# Patient Record
Sex: FEMALE | Race: WHITE | NOT HISPANIC OR LATINO | Employment: FULL TIME | URBAN - METROPOLITAN AREA
[De-identification: names, ages, dates, MRNs, and addresses within clinical notes are randomized per-mention and may not be internally consistent; named-entity substitution may affect disease eponyms.]

---

## 2018-01-11 NOTE — MISCELLANEOUS
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)    Plan  EBV seropositivity, Splenic mass    · Follow-up PRN Evaluation and Treatment  Follow-up  Status: Complete  Done:  [id number]   Ordered; For: EBV seropositivity, Splenic mass; Ordered By: Shashi Elias Performed:  Due: [id number]    Discussion/Summary  Discussion Summary: You appear to be doing better  Please make a follow up with Dr Samantha Ferris of Infectious Disease for follow up as recommended when you were discharged  Follow up in 1mo of symptoms persist or sooner if needed  Chief Complaint  Chief Complaint Free Text Note Form: Patient is here today for a hospital follow up, says she is feeling much more like herself  History of Present Illness  TCM Communication [facility]: The patient is being contacted for follow-up after hospitalization and [facility]  She was hospitalized at [facility]  The date of admission: 08/15/2016, date of discharge: 08/17/2016  Diagnosis: Generalized ab pain, Transaminitis, splenic lesion, hypokalemia  She was discharged to home  She scheduled a follow up appointment  Follow-up appointments with other specialists: Infectious Disease Dr PRINCE VELASQUEZ [facility]  feels itchy all over body, glands swollen, Counseling was provided to the patient  Communication performed and completed by TB   HPI: Pt presents for hospital f/u  Presented to this office a few wks ago for various symptoms (see OV note 8/15/16) and sent to the ER  Pt was admitted and found to have a splenic lesion and has tested positive for EBV  Pt states she is doing much better and is 98% better  Still has some fatigue  Is back to work  Did not f/u w/ Dr Samantha Ferris of ID as per discharge summary  No acute complaints today  Review of Systems  Complete-Female:   Constitutional: no fever  Cardiovascular: no chest pain  Respiratory: no shortness of breath  Gastrointestinal: no abdominal pain  Neurological: no headache  Surgical History  Surgical History Reviewed: The surgical history was reviewed and updated today  Family History  Mother    1  Family history of arthritis (V17 7) (Z82 61)   2  Family history of hypertension (V17 49) (Z82 49)  Grandmother    3  Family history of arthritis (V17 7) (Z82 61)   4  Family history of cardiac disorder (V17 49) (Z82 49)   5  Family history of hypertension (V17 49) (Z82 49)   6  Family history of malignant neoplasm of breast (V16 3) (Z80 3)   7  Family history of osteoporosis (V17 81) (Z82 62)  Grandfather    8  Family history of arthritis (V17 7) (Z82 61)   9  Family history of cardiac disorder (V17 49) (Z82 49)   10  Family history of cerebrovascular accident (CVA) (V17 1) (Z82 3)   6  Family history of diabetes mellitus (V18 0) (Z83 3)   12  Family history of hypertension (V17 49) (Z82 49)   13  Family history of osteoporosis (V17 81) (Z82 62)  Family History Reviewed: The family history was reviewed and updated today  Social History    · Alcohol use (V49 89) (Z78 9)   · Caffeine use (V49 89) (F15 90)   · Never a smoker  Social History Reviewed: The social history was reviewed and updated today  Current Meds   1  No Reported Medications Recorded  Medication List Reviewed: The medication list was reviewed and updated today  Allergies    1  Augmentin TABS    Vitals  Signs   Recorded: 13GGA2538 74:90AZ   Systolic: 386  Diastolic: 76  Heart Rate: 80  Respiration: 20  Temperature: 98 7 F  Weight: 242 lb 2 08 oz    Physical Exam    Constitutional   General appearance: No acute distress, well appearing and well nourished  Eyes   Conjunctiva and lids: No swelling, erythema or discharge  Pupils and irises: Equal, round and reactive to light  Ears, Nose, Mouth, and Throat   External inspection of ears and nose: Normal     Otoscopic examination: Tympanic membranes translucent with normal light reflex  Canals patent without erythema      Nasal mucosa, septum, and turbinates: Normal without edema or erythema  Oropharynx: Normal with no erythema, edema, exudate or lesions  Pulmonary   Respiratory effort: No increased work of breathing or signs of respiratory distress  Auscultation of lungs: Clear to auscultation  Cardiovascular   Auscultation of heart: Normal rate and rhythm, normal S1 and S2, without murmurs  Examination of extremities for edema and/or varicosities: Normal     Abdomen   Abdomen: Non-tender, no masses  Liver and spleen: No hepatomegaly or splenomegaly  Lymphatic   Palpation of lymph nodes in neck: No lymphadenopathy  Musculoskeletal   Gait and station: Normal     Neurologic   Cranial nerves: Cranial nerves 2-12 intact  Psychiatric   Orientation to person, place, and time: Normal     Mood and affect: Normal          Provider Comments  Provider Comments:   Pt appears to be doing well  Records form hospital stay reviewed  EBV positive and Splenic mass on imaging  Was supposed to see Dr Nazario Lopez yesterday per discharge summary but did not  I recommended pt call him to make an appt  Pt states she should have his info at home in her discharge packet but will call this office if she needs any information  No additional testing at this time until f/u w/ ID        Signatures   Electronically signed by : ROSA Walsh ; Aug 30 2016  7:10PM EST                       (Author)

## 2018-01-18 NOTE — RESULT NOTES
Verified Results  Urine Dip Automated- POC [id number] 03:49PM [place] Valdemar     Test Name Result Flag Reference   Color Criss     Clarity Transparent     Leukocytes 1+ A    Nitrite neg     Blood 1+ A    Bilirubin 1+     Urobilinogen 2 A    Protein 1+ A    Ph 6     Specific Gravity 1 020     Ketone 2+ A    Glucose neg

## 2018-10-29 ENCOUNTER — OFFICE VISIT (OUTPATIENT)
Dept: FAMILY MEDICINE CLINIC | Facility: CLINIC | Age: 33
End: 2018-10-29
Payer: COMMERCIAL

## 2018-10-29 VITALS
TEMPERATURE: 97.2 F | HEIGHT: 65 IN | OXYGEN SATURATION: 100 % | SYSTOLIC BLOOD PRESSURE: 128 MMHG | BODY MASS INDEX: 44.95 KG/M2 | DIASTOLIC BLOOD PRESSURE: 80 MMHG | RESPIRATION RATE: 20 BRPM | HEART RATE: 77 BPM | WEIGHT: 269.8 LBS

## 2018-10-29 DIAGNOSIS — R03.0 BLOOD PRESSURE ELEVATED WITHOUT HISTORY OF HTN: ICD-10-CM

## 2018-10-29 DIAGNOSIS — R16.1 SPLENIC MASS: ICD-10-CM

## 2018-10-29 DIAGNOSIS — R10.9 RIGHT FLANK PAIN: ICD-10-CM

## 2018-10-29 DIAGNOSIS — R20.0 NUMBNESS AND TINGLING: ICD-10-CM

## 2018-10-29 DIAGNOSIS — S89.91XA INJURY OF RIGHT LOWER LEG, INITIAL ENCOUNTER: ICD-10-CM

## 2018-10-29 DIAGNOSIS — R59.0 ABDOMINAL LYMPHADENOPATHY: ICD-10-CM

## 2018-10-29 DIAGNOSIS — Z23 NEED FOR IMMUNIZATION AGAINST INFLUENZA: ICD-10-CM

## 2018-10-29 DIAGNOSIS — E78.00 PURE HYPERCHOLESTEROLEMIA: ICD-10-CM

## 2018-10-29 DIAGNOSIS — F32.1 CURRENT MODERATE EPISODE OF MAJOR DEPRESSIVE DISORDER WITHOUT PRIOR EPISODE (HCC): ICD-10-CM

## 2018-10-29 DIAGNOSIS — R20.2 NUMBNESS AND TINGLING: ICD-10-CM

## 2018-10-29 DIAGNOSIS — R82.90 ABNORMAL URINE FINDING: Primary | ICD-10-CM

## 2018-10-29 PROCEDURE — 99214 OFFICE O/P EST MOD 30 MIN: CPT | Performed by: FAMILY MEDICINE

## 2018-10-29 PROCEDURE — 90686 IIV4 VACC NO PRSV 0.5 ML IM: CPT

## 2018-10-29 PROCEDURE — 90471 IMMUNIZATION ADMIN: CPT

## 2018-10-29 PROCEDURE — 3008F BODY MASS INDEX DOCD: CPT | Performed by: FAMILY MEDICINE

## 2018-10-29 RX ORDER — ESCITALOPRAM OXALATE 10 MG/1
5 TABLET ORAL DAILY
Qty: 30 TABLET | Refills: 0 | Status: SHIPPED | OUTPATIENT
Start: 2018-10-29 | End: 2018-11-15 | Stop reason: SDUPTHER

## 2018-10-29 NOTE — PROGRESS NOTES
Assessment/Plan:     Problem List Items Addressed This Visit     Splenic mass    Relevant Orders    CT abdomen pelvis w contrast      Other Visit Diagnoses     Abnormal urine finding    -  Primary    Positive ketone  advised patient to increase fluid intake    Relevant Orders    Urinalysis with reflex to microscopic    Right flank pain        Chronic  Relieved after passing her urine  Rule out hydronephrosis    Relevant Orders    Comprehensive metabolic panel    CBC and differential    CT abdomen pelvis w contrast    Numbness and tingling         right heel  Improving, patient to call if continue will check nerve test    Abdominal lymphadenopathy        Relevant Orders    Comprehensive metabolic panel    CBC and differential    CT abdomen pelvis w contrast    Injury of right lower leg, initial encounter        Improving  Avoid strenuous activity, and keep abrasion clean and dry, patient to call if any further problems    Blood pressure elevated without history of HTN        Will recheck blood pressure with next office    Pure hypercholesterolemia        To follow with low-fat diet    Relevant Orders    Lipid Panel with Direct LDL reflex    Current moderate episode of major depressive disorder without prior episode ([facility] [address] )        Lexapro as directed  Avoid drinking alcohol with medication    Relevant Medications    escitalopram (LEXAPRO) 10 mg tablet    Other Relevant Orders    TSH baseline    Need for immunization against influenza        Relevant Orders    SYRINGE/SINGLE-DOSE VIAL: influenza vaccine, [id number], quadrivalent, 0 5 mL, preservative-free, for patients 3+ yr (FLUZONE) (Completed)           Diagnoses and all orders for this visit:    Abnormal urine finding  Comments:  Positive ketone  advised patient to increase fluid intake  Orders:  -     Urinalysis with reflex to microscopic    Right flank pain  Comments:  Chronic  Relieved after passing her urine    Rule out hydronephrosis  Orders:  - Comprehensive metabolic panel; Future  -     CBC and differential; Future  -     CT abdomen pelvis w contrast; Future    Numbness and tingling  Comments:   right heel  Improving, patient to call if continue will check nerve test    Splenic mass  -     CT abdomen pelvis w contrast; Future    Abdominal lymphadenopathy  -     Comprehensive metabolic panel; Future  -     CBC and differential; Future  -     CT abdomen pelvis w contrast; Future    Injury of right lower leg, initial encounter  Comments:  Improving  Avoid strenuous activity, and keep abrasion clean and dry, patient to call if any further problems    Blood pressure elevated without history of HTN  Comments: Will recheck blood pressure with next office    Pure hypercholesterolemia  Comments: To follow with low-fat diet  Orders:  -     Lipid Panel with Direct LDL reflex; Future    Current moderate episode of major depressive disorder without prior episode ([facility] [address] )  Comments:  Lexapro as directed  Avoid drinking alcohol with medication  Orders:  -     escitalopram (LEXAPRO) 10 mg tablet; Take 0 5 tablets (5 mg total) by mouth daily  -     TSH baseline; Future    Need for immunization against influenza  -     SYRINGE/SINGLE-DOSE VIAL: influenza vaccine, [id number], quadrivalent, 0 5 mL, preservative-free, for patients 3+ yr (FLUZONE)            Subjective:     Patient ID: Ruddy Laureano is a 34 y o  female      Patient is here for  Patient stated she had a urine dip when she had her physical for work  Was told her urine dip had large ketones  Patient stated her urine dip was negative for sugar  Patient believe does not drink enough fluids during the day  Depression  , patient to continue to have moderate depression since last office visit     Patient ran out of Lexapro   But did not call to refill it  Denied suicide or homicide  Denied drinking alcohol or doing drugs  Patient admit to mild pain at the right flank area for 1 year, off and on    Patient feels it mostly in the morning     Her pain resolved after she passed her urine  Denied urinary frequency, dysuria or hematuria  Denied injury to her back, admit to heavy lifting at work sometimes  Complain of tingling and numbness at the bottom of the right heel started 1 week ago  It is getting better  Denied swelling or or mass, denied injury to the heel  Admit to injury to the right lower leg, happen about 4 days ago  Patient stated there was a gap between the trailer and that the doc, she felt  She bumped her right lower leg, developed mild pain, and ecchymosis   Pain is better  And patient stated she has been running on her leg with no problem  Had Tdap 2015    No test done since last office visit  Last menstrual period finished 2 days ago  However abdominal and pelvic CT scan done August 2016 reviewed results with patient again        Review of Systems   Constitutional: Negative for activity change, appetite change, chills, fatigue, fever and unexpected weight change  HENT: Negative for congestion, ear pain, sinus pressure and sore throat  Eyes: Negative for visual disturbance  Respiratory: Negative for cough, chest tightness, shortness of breath and wheezing  Cardiovascular: Negative for chest pain, palpitations and leg swelling  Gastrointestinal: Negative for abdominal pain, blood in stool, constipation, diarrhea, nausea and vomiting  Genitourinary: Negative for dysuria, frequency, hematuria and urgency  Musculoskeletal: Negative for arthralgias, gait problem, joint swelling, myalgias and neck pain  Skin: Negative for rash  Neurological: Negative for dizziness, tremors, seizures, syncope, facial asymmetry, weakness, light-headedness and headaches  Hematological: Negative for adenopathy  Does not bruise/bleed easily  Psychiatric/Behavioral: Negative for behavioral problems, confusion, dysphoric mood and sleep disturbance         Objective:     Physical Exam   Constitutional: She is oriented to person, place, and time  She appears well-developed and well-nourished  No distress  HENT:   Head: Normocephalic  Eyes: Pupils are equal, round, and reactive to light  Conjunctivae and EOM are normal  No scleral icterus  Neck: No JVD present  No thyromegaly present  Cardiovascular: Normal rate and regular rhythm  No murmur heard  Pulses:       Carotid pulses are 3+ on the right side, and 3+ on the left side  Popliteal pulses are 3+ on the right side, and 3+ on the left side  Dorsalis pedis pulses are 3+ on the right side  Pulmonary/Chest: Effort normal and breath sounds normal  She has no wheezes  Abdominal: Soft  She exhibits no mass  There is no tenderness  There is no rebound and no guarding  No hernia  No flank tenderness   Musculoskeletal:   Positive mild ecchymosis at the lower distal half of the lower leg medially  With slight dried superficial abrasion  With localized minimal swelling  No calf tenderness or firmness  Lymphadenopathy:     She has no cervical adenopathy  Neurological: She is alert and oriented to person, place, and time  No cranial nerve deficit  She exhibits normal muscle tone  Coordination normal    Skin: No rash noted  Psychiatric: She has a normal mood and affect   Her behavior is normal  Judgment and thought content normal

## 2018-10-31 ENCOUNTER — APPOINTMENT (OUTPATIENT)
Dept: LAB | Facility: CLINIC | Age: 33
End: 2018-10-31
Payer: COMMERCIAL

## 2018-10-31 DIAGNOSIS — R59.0 ABDOMINAL LYMPHADENOPATHY: ICD-10-CM

## 2018-10-31 DIAGNOSIS — F32.1 CURRENT MODERATE EPISODE OF MAJOR DEPRESSIVE DISORDER WITHOUT PRIOR EPISODE (HCC): ICD-10-CM

## 2018-10-31 DIAGNOSIS — E78.00 PURE HYPERCHOLESTEROLEMIA: ICD-10-CM

## 2018-10-31 DIAGNOSIS — R10.9 RIGHT FLANK PAIN: ICD-10-CM

## 2018-10-31 LAB
ALBUMIN SERPL BCP-MCNC: 3.8 G/DL (ref 3.5–5)
ALP SERPL-CCNC: 63 U/L (ref 46–116)
ALT SERPL W P-5'-P-CCNC: 23 U/L (ref 12–78)
ANION GAP SERPL CALCULATED.3IONS-SCNC: 10 MMOL/L (ref 4–13)
AST SERPL W P-5'-P-CCNC: 13 U/L (ref 5–45)
BACTERIA UR QL AUTO: ABNORMAL /HPF
BASOPHILS # BLD AUTO: 0.03 THOUSANDS/ΜL (ref 0–0.1)
BASOPHILS NFR BLD AUTO: 1 % (ref 0–1)
BILIRUB SERPL-MCNC: 0.2 MG/DL (ref 0.2–1)
BILIRUB UR QL STRIP: NEGATIVE
BUN SERPL-MCNC: 10 MG/DL (ref 5–25)
CALCIUM SERPL-MCNC: 8.9 MG/DL (ref 8.3–10.1)
CHLORIDE SERPL-SCNC: 106 MMOL/L (ref 100–108)
CHOLEST SERPL-MCNC: 164 MG/DL (ref 50–200)
CLARITY UR: ABNORMAL
CO2 SERPL-SCNC: 24 MMOL/L (ref 21–32)
COLOR UR: YELLOW
CREAT SERPL-MCNC: 0.66 MG/DL (ref 0.6–1.3)
EOSINOPHIL # BLD AUTO: 0.06 THOUSAND/ΜL (ref 0–0.61)
EOSINOPHIL NFR BLD AUTO: 2 % (ref 0–6)
ERYTHROCYTE [DISTWIDTH] IN BLOOD BY AUTOMATED COUNT: 14.6 % (ref 11.6–15.1)
GFR SERPL CREATININE-BSD FRML MDRD: 118 ML/MIN/1.73SQ M
GLUCOSE P FAST SERPL-MCNC: 96 MG/DL (ref 65–99)
GLUCOSE UR STRIP-MCNC: NEGATIVE MG/DL
HCT VFR BLD AUTO: 39.9 % (ref 34.8–46.1)
HDLC SERPL-MCNC: 76 MG/DL (ref 40–60)
HGB BLD-MCNC: 12.5 G/DL (ref 11.5–15.4)
HGB UR QL STRIP.AUTO: ABNORMAL
IMM GRANULOCYTES # BLD AUTO: 0 THOUSAND/UL (ref 0–0.2)
IMM GRANULOCYTES NFR BLD AUTO: 0 % (ref 0–2)
KETONES UR STRIP-MCNC: NEGATIVE MG/DL
LDLC SERPL CALC-MCNC: 76 MG/DL (ref 0–100)
LEUKOCYTE ESTERASE UR QL STRIP: ABNORMAL
LYMPHOCYTES # BLD AUTO: 1.31 THOUSANDS/ΜL (ref 0.6–4.47)
LYMPHOCYTES NFR BLD AUTO: 33 % (ref 14–44)
MCH RBC QN AUTO: 26.3 PG (ref 26.8–34.3)
MCHC RBC AUTO-ENTMCNC: 31.3 G/DL (ref 31.4–37.4)
MCV RBC AUTO: 84 FL (ref 82–98)
MONOCYTES # BLD AUTO: 0.43 THOUSAND/ΜL (ref 0.17–1.22)
MONOCYTES NFR BLD AUTO: 11 % (ref 4–12)
MUCOUS THREADS UR QL AUTO: ABNORMAL
NEUTROPHILS # BLD AUTO: 2.13 THOUSANDS/ΜL (ref 1.85–7.62)
NEUTS SEG NFR BLD AUTO: 53 % (ref 43–75)
NITRITE UR QL STRIP: NEGATIVE
NON-SQ EPI CELLS URNS QL MICRO: ABNORMAL /HPF
NRBC BLD AUTO-RTO: 0 /100 WBCS
PH UR STRIP.AUTO: 6.5 [PH] (ref 4.5–8)
PLATELET # BLD AUTO: 211 THOUSANDS/UL (ref 149–390)
PMV BLD AUTO: 11.1 FL (ref 8.9–12.7)
POTASSIUM SERPL-SCNC: 4.2 MMOL/L (ref 3.5–5.3)
PROT SERPL-MCNC: 7 G/DL (ref 6.4–8.2)
PROT UR STRIP-MCNC: NEGATIVE MG/DL
RBC # BLD AUTO: 4.76 MILLION/UL (ref 3.81–5.12)
RBC #/AREA URNS AUTO: ABNORMAL /HPF
SODIUM SERPL-SCNC: 140 MMOL/L (ref 136–145)
SP GR UR STRIP.AUTO: 1.02 (ref 1–1.03)
TRIGL SERPL-MCNC: 58 MG/DL
TSH SERPL DL<=0.05 MIU/L-ACNC: 1.37 UIU/ML (ref 0.36–3.74)
UROBILINOGEN UR QL STRIP.AUTO: 0.2 E.U./DL
WBC # BLD AUTO: 3.96 THOUSAND/UL (ref 4.31–10.16)
WBC #/AREA URNS AUTO: ABNORMAL /HPF

## 2018-10-31 PROCEDURE — 80061 LIPID PANEL: CPT

## 2018-10-31 PROCEDURE — 85025 COMPLETE CBC W/AUTO DIFF WBC: CPT

## 2018-10-31 PROCEDURE — 84443 ASSAY THYROID STIM HORMONE: CPT

## 2018-10-31 PROCEDURE — 36415 COLL VENOUS BLD VENIPUNCTURE: CPT

## 2018-10-31 PROCEDURE — 81001 URINALYSIS AUTO W/SCOPE: CPT | Performed by: FAMILY MEDICINE

## 2018-10-31 PROCEDURE — 80053 COMPREHEN METABOLIC PANEL: CPT

## 2018-11-07 ENCOUNTER — HOSPITAL ENCOUNTER (OUTPATIENT)
Dept: CT IMAGING | Facility: HOSPITAL | Age: 33
Discharge: HOME | End: 2018-11-07
Payer: COMMERCIAL

## 2018-11-07 DIAGNOSIS — R16.1 SPLENIC MASS: ICD-10-CM

## 2018-11-07 DIAGNOSIS — R10.9 RIGHT FLANK PAIN: ICD-10-CM

## 2018-11-07 DIAGNOSIS — R59.0 ABDOMINAL LYMPHADENOPATHY: ICD-10-CM

## 2018-11-07 PROCEDURE — 74177 CT ABD & PELVIS W/CONTRAST: CPT

## 2018-11-07 RX ADMIN — IOHEXOL 100 ML: 350 INJECTION, SOLUTION INTRAVENOUS at 20:10

## 2018-11-15 ENCOUNTER — OFFICE VISIT (OUTPATIENT)
Dept: FAMILY MEDICINE CLINIC | Facility: CLINIC | Age: 33
End: 2018-11-15
Payer: COMMERCIAL

## 2018-11-15 VITALS
SYSTOLIC BLOOD PRESSURE: 110 MMHG | WEIGHT: 266 LBS | RESPIRATION RATE: 20 BRPM | TEMPERATURE: 97.8 F | DIASTOLIC BLOOD PRESSURE: 80 MMHG | OXYGEN SATURATION: 99 % | BODY MASS INDEX: 44.26 KG/M2 | HEART RATE: 61 BPM

## 2018-11-15 DIAGNOSIS — N93.9 VAGINAL SPOTTING: ICD-10-CM

## 2018-11-15 DIAGNOSIS — R59.0 ABDOMINAL LYMPHADENOPATHY: ICD-10-CM

## 2018-11-15 DIAGNOSIS — R16.1 SPLENOMEGALY: ICD-10-CM

## 2018-11-15 DIAGNOSIS — S89.91XD INJURY OF RIGHT LOWER LEG, SUBSEQUENT ENCOUNTER: ICD-10-CM

## 2018-11-15 DIAGNOSIS — R10.9 RIGHT FLANK PAIN: ICD-10-CM

## 2018-11-15 DIAGNOSIS — D72.819 LEUKOPENIA, UNSPECIFIED TYPE: ICD-10-CM

## 2018-11-15 DIAGNOSIS — R16.1 SPLENIC MASS: Primary | ICD-10-CM

## 2018-11-15 DIAGNOSIS — R20.2 NUMBNESS AND TINGLING: ICD-10-CM

## 2018-11-15 DIAGNOSIS — R20.0 NUMBNESS AND TINGLING: ICD-10-CM

## 2018-11-15 DIAGNOSIS — E78.00 PURE HYPERCHOLESTEROLEMIA: ICD-10-CM

## 2018-11-15 DIAGNOSIS — F32.5 MAJOR DEPRESSIVE DISORDER WITH SINGLE EPISODE, IN FULL REMISSION (HCC): ICD-10-CM

## 2018-11-15 DIAGNOSIS — R82.90 ABNORMAL URINE FINDING: ICD-10-CM

## 2018-11-15 DIAGNOSIS — F32.1 CURRENT MODERATE EPISODE OF MAJOR DEPRESSIVE DISORDER WITHOUT PRIOR EPISODE (HCC): ICD-10-CM

## 2018-11-15 LAB — SL AMB POCT URINE HCG: NEGATIVE

## 2018-11-15 PROCEDURE — 99214 OFFICE O/P EST MOD 30 MIN: CPT | Performed by: FAMILY MEDICINE

## 2018-11-15 PROCEDURE — 81025 URINE PREGNANCY TEST: CPT | Performed by: FAMILY MEDICINE

## 2018-11-15 RX ORDER — ESCITALOPRAM OXALATE 10 MG/1
10 TABLET ORAL DAILY
Qty: 30 TABLET | Refills: 1 | Status: SHIPPED | OUTPATIENT
Start: 2018-11-15 | End: 2019-04-09 | Stop reason: ALTCHOICE

## 2018-11-15 NOTE — PROGRESS NOTES
Assessment/Plan:          Diagnoses and all orders for this visit:    Splenic mass  -     MRI abdomen w wo contrast; Future    Splenomegaly  -     MRI abdomen w wo contrast; Future    Abnormal urine finding  -     Urine culture; Future    Abdominal lymphadenopathy  Comments:  resolved    Right flank pain  Comments:  Most likely muscular   However patient is going to have her prior MRI and urine culture , rule out any renal disease  Injury of right lower leg, subsequent encounter  Comments:  Improving  To call if any further problems    Numbness and tingling  Comments:  Of the right ear  Resolved    Pure hypercholesterolemia  Comments: To follow with low-fat diet    Major [facility] with [facility]  Comments:  Improving  Continue medication    Leukopenia, unspecified type  -     CBC and differential; Future    Vaginal spotting  Comments: To follow with her gyn Dr Hema Judd:  -     POCT urine HCG  -     US pelvis complete non OB; Future    Current moderate episode of major depressive disorder without prior episode ([facility] )  Comments:  Lexapro as directed  Avoid drinking alcohol with medication  Orders:  -     escitalopram (LEXAPRO) 10 mg tablet; Take 1 tablet (10 mg total) by mouth daily            Subjective:     Patient ID: Cl Weaver is a 34 y o  female      Patient is here for follow-up   Right flank pain  Patient stated it same , she feels it in the morning and resolved after she passed her urine  Denied any problem with urination  Patient at the [facility] and she does a lot of physical activity and heavy lifting  Depression  Taking Lexapro she feeling much better she is not depressed denied anxiety, suicidal or homicidal   Denied side effect with Lexapro,taking 10 mg daily  Tingling of the right heel resolved  Contusion of the right lower leg improving  Ecchymosis resolved no pain    Obesity admitted to intentional weight loss  Patient did mention she had been vaginal spotting for the last 1 week  Denied pelvic pain  Patient is not sexually active with with a Male partner  Last menstrual period was about 3 weeks    Test results  CT scan of the abdomen  Lab done on October 31, 2018 discussed with patient        Review of Systems   Constitutional: Negative for activity change, appetite change, chills, fatigue, fever and unexpected weight change  HENT: Negative for congestion, ear pain, sinus pressure and sore throat  Eyes: Negative for visual disturbance  Respiratory: Negative for cough, chest tightness, shortness of breath and wheezing  Cardiovascular: Negative for chest pain, palpitations and leg swelling  Gastrointestinal: Negative for abdominal pain, blood in stool, constipation, diarrhea, nausea and vomiting  Genitourinary: Negative for dysuria, frequency, hematuria, urgency, vaginal discharge and vaginal pain  Musculoskeletal: Negative for arthralgias, back pain, gait problem, joint swelling, myalgias and neck pain  Skin: Negative for rash  Neurological: Negative for dizziness, tremors, seizures, syncope, weakness, light-headedness and headaches  Hematological: Negative for adenopathy  Does not bruise/bleed easily  Psychiatric/Behavioral: Negative for behavioral problems, confusion, dysphoric mood and sleep disturbance  Objective:     Physical Exam   Constitutional: She is oriented to person, place, and time  She appears well-developed and well-nourished  No distress  HENT:   Head: Normocephalic  Eyes: Pupils are equal, round, and reactive to light  Conjunctivae and EOM are normal  No scleral icterus  Neck: No JVD present  No thyromegaly present  Cardiovascular: Normal rate and regular rhythm  No murmur heard  Extremities  No edema   Pulmonary/Chest: Effort normal and breath sounds normal    Abdominal: Soft  Bowel sounds are normal  She exhibits no mass  There is no tenderness  There is no rebound and no guarding  No hernia  Musculoskeletal: She exhibits no edema  Right lower leg   no ecchymosis , there is the a small firm area at the inner lower right lower leg ,about 1 and half by 1 inch   right flank area not tender   Lymphadenopathy:     She has no cervical adenopathy  Neurological: She is alert and oriented to person, place, and time  No cranial nerve deficit  She exhibits normal muscle tone  Coordination normal    Skin: No rash noted  Psychiatric: She has a normal mood and affect   Her behavior is normal  Judgment and thought content normal

## 2018-11-19 ENCOUNTER — HOSPITAL ENCOUNTER (OUTPATIENT)
Dept: ULTRASOUND IMAGING | Facility: HOSPITAL | Age: 33
Discharge: HOME | End: 2018-11-19
Payer: COMMERCIAL

## 2018-11-19 DIAGNOSIS — N93.9 VAGINAL SPOTTING: ICD-10-CM

## 2018-11-19 PROCEDURE — 76830 TRANSVAGINAL US NON-OB: CPT

## 2018-11-19 PROCEDURE — 76856 US EXAM PELVIC COMPLETE: CPT

## 2018-11-20 ENCOUNTER — DOCUMENTATION (OUTPATIENT)
Dept: FAMILY MEDICINE CLINIC | Facility: CLINIC | Age: 33
End: 2018-11-20

## 2018-12-31 ENCOUNTER — APPOINTMENT (OUTPATIENT)
Dept: LAB | Facility: CLINIC | Age: 33
End: 2018-12-31
Payer: COMMERCIAL

## 2018-12-31 DIAGNOSIS — D72.819 LEUKOPENIA, UNSPECIFIED TYPE: ICD-10-CM

## 2018-12-31 DIAGNOSIS — R82.90 ABNORMAL URINE FINDING: ICD-10-CM

## 2018-12-31 LAB
BASOPHILS # BLD AUTO: 0.02 THOUSANDS/ΜL (ref 0–0.1)
BASOPHILS NFR BLD AUTO: 0 % (ref 0–1)
EOSINOPHIL # BLD AUTO: 0.06 THOUSAND/ΜL (ref 0–0.61)
EOSINOPHIL NFR BLD AUTO: 1 % (ref 0–6)
ERYTHROCYTE [DISTWIDTH] IN BLOOD BY AUTOMATED COUNT: 15.3 % (ref 11.6–15.1)
HCT VFR BLD AUTO: 40.3 % (ref 34.8–46.1)
HGB BLD-MCNC: 12.7 G/DL (ref 11.5–15.4)
IMM GRANULOCYTES # BLD AUTO: 0.01 THOUSAND/UL (ref 0–0.2)
IMM GRANULOCYTES NFR BLD AUTO: 0 % (ref 0–2)
LYMPHOCYTES # BLD AUTO: 2.02 THOUSANDS/ΜL (ref 0.6–4.47)
LYMPHOCYTES NFR BLD AUTO: 33 % (ref 14–44)
MCH RBC QN AUTO: 26.2 PG (ref 26.8–34.3)
MCHC RBC AUTO-ENTMCNC: 31.5 G/DL (ref 31.4–37.4)
MCV RBC AUTO: 83 FL (ref 82–98)
MONOCYTES # BLD AUTO: 0.41 THOUSAND/ΜL (ref 0.17–1.22)
MONOCYTES NFR BLD AUTO: 7 % (ref 4–12)
NEUTROPHILS # BLD AUTO: 3.64 THOUSANDS/ΜL (ref 1.85–7.62)
NEUTS SEG NFR BLD AUTO: 59 % (ref 43–75)
NRBC BLD AUTO-RTO: 0 /100 WBCS
PLATELET # BLD AUTO: 247 THOUSANDS/UL (ref 149–390)
PMV BLD AUTO: 11 FL (ref 8.9–12.7)
RBC # BLD AUTO: 4.84 MILLION/UL (ref 3.81–5.12)
WBC # BLD AUTO: 6.16 THOUSAND/UL (ref 4.31–10.16)

## 2018-12-31 PROCEDURE — 87086 URINE CULTURE/COLONY COUNT: CPT

## 2018-12-31 PROCEDURE — 87147 CULTURE TYPE IMMUNOLOGIC: CPT

## 2018-12-31 PROCEDURE — 85025 COMPLETE CBC W/AUTO DIFF WBC: CPT

## 2018-12-31 PROCEDURE — 36415 COLL VENOUS BLD VENIPUNCTURE: CPT

## 2019-01-02 LAB
BACTERIA UR CULT: ABNORMAL
BACTERIA UR CULT: ABNORMAL

## 2019-01-09 ENCOUNTER — OFFICE VISIT (OUTPATIENT)
Dept: FAMILY MEDICINE CLINIC | Facility: CLINIC | Age: 34
End: 2019-01-09
Payer: COMMERCIAL

## 2019-01-09 VITALS
TEMPERATURE: 98.8 F | RESPIRATION RATE: 20 BRPM | BODY MASS INDEX: 45.53 KG/M2 | OXYGEN SATURATION: 100 % | DIASTOLIC BLOOD PRESSURE: 80 MMHG | SYSTOLIC BLOOD PRESSURE: 110 MMHG | WEIGHT: 273.6 LBS | HEART RATE: 83 BPM

## 2019-01-09 DIAGNOSIS — J06.9 UPPER RESPIRATORY TRACT INFECTION, UNSPECIFIED TYPE: ICD-10-CM

## 2019-01-09 DIAGNOSIS — D72.819 LEUKOPENIA, UNSPECIFIED TYPE: ICD-10-CM

## 2019-01-09 DIAGNOSIS — N93.9 VAGINAL SPOTTING: ICD-10-CM

## 2019-01-09 DIAGNOSIS — R59.0 LYMPHADENOPATHY, CERVICAL: ICD-10-CM

## 2019-01-09 DIAGNOSIS — R82.90 ABNORMAL URINE FINDINGS: ICD-10-CM

## 2019-01-09 DIAGNOSIS — R10.9 RIGHT FLANK PAIN: ICD-10-CM

## 2019-01-09 DIAGNOSIS — R16.1 SPLENOMEGALY: ICD-10-CM

## 2019-01-09 DIAGNOSIS — R16.1 SPLENIC MASS: Primary | ICD-10-CM

## 2019-01-09 DIAGNOSIS — N60.29 LUMPY BREASTS, UNSPECIFIED LATERALITY: ICD-10-CM

## 2019-01-09 PROCEDURE — 99214 OFFICE O/P EST MOD 30 MIN: CPT | Performed by: FAMILY MEDICINE

## 2019-01-09 NOTE — PROGRESS NOTES
Assessment/Plan:          Diagnoses and all orders for this visit:    Splenic mass  -     MRI abdomen w wo contrast; Future    Splenomegaly  Comments: To consider referral to GI  Await abdominal MRI  Right flank pain  Comments:  Improving  To call if any further symptoms    Vaginal spotting  Comments: To follow with her gyn    Leukopenia, unspecified type  Comments:   corrected    Abnormal urine findings  -     Urine culture; Future  -     Urinalysis with reflex to microscopic    Upper respiratory tract infection, unspecified type  Comments:  Improving  No antibiotic needed at this the time patient to call if any further problem    Lumpy breasts, unspecified laterality  Comments:  Patient stated she is going to see her gyn  Lymphadenopathy, cervical  -     CT soft tissue neck w contrast; Future            Subjective:     Patient ID: Sourav Dillon is a 34 y o  female      Patient is here for follow-up on her  medical problems  Right flank pain patient stated better it is 90% improved  Denied abdominal pain  Denied problem with urination  Splenic mass  Denied left upper quadrant abdominal pain or weight loss  Positive UA denied problem with urination denied dysuria, hematuria or urinary frequency  Vaginal spotting  Patient stated she did not see any more vaginal bleeding  Did not see her gyn yet  New complaint  The enlarged lymph node  Located at the right neck and started about 1-2 years ago  Stable  Patient stated sometimes when she check her right rest it feels lump redness but there is no specific lump  Has been going on for 2 years  Cold symptoms  Patient stated she had clear runny nose and a slight nasal congestion for 6 days, also had slight left earache  Had fever the 1st day of her cold symptoms 101 but no further fever  Symptoms are improving  Test results     Lab done on December 31, 2018  Pelvic ultrasound  Discussed result with patient  Abdominal MRI was not done    Not approved by her insurance company        Review of Systems   Constitutional: Negative for activity change, appetite change, chills, fatigue, fever and unexpected weight change  HENT: Negative for congestion, ear discharge, hearing loss, mouth sores and sore throat  Eyes: Negative for visual disturbance  Respiratory: Negative for cough, chest tightness, shortness of breath and wheezing  Cardiovascular: Negative for chest pain, palpitations and leg swelling  Gastrointestinal: Negative for abdominal pain, blood in stool, constipation, diarrhea, nausea and vomiting  Genitourinary: Negative for dysuria, frequency, hematuria and urgency  Musculoskeletal: Negative for arthralgias, back pain, gait problem, joint swelling, myalgias and neck pain  Skin: Negative for rash  Neurological: Negative for dizziness, tremors, seizures, syncope, weakness, light-headedness and headaches  Hematological: Negative for adenopathy  Does not bruise/bleed easily  Psychiatric/Behavioral: Negative for behavioral problems, confusion, dysphoric mood and sleep disturbance  Objective:     Physical Exam   Constitutional: She is oriented to person, place, and time  She appears well-developed and well-nourished  No distress  HENT:   Head: Normocephalic and atraumatic  Right Ear: External ear normal    Left Ear: External ear normal    Nose: Nose normal    Mouth/Throat: Oropharynx is clear and moist  No oropharyngeal exudate  Eyes: Pupils are equal, round, and reactive to light  Conjunctivae and EOM are normal  No scleral icterus  Neck: Normal range of motion  Neck supple  No JVD present  No thyromegaly present  There is 2 small lymph node at the lateral lower neck left side about 1 cm each, not tender to touch   Cardiovascular: Normal rate, regular rhythm and normal heart sounds  No murmur heard  Extremities  No edema   Pulmonary/Chest: Effort normal and breath sounds normal    Abdominal: Soft   Bowel sounds are normal  She exhibits no mass  There is no tenderness  There is no rebound and no guarding  No hernia  No CVA tenderness bilaterally   Genitourinary:   Genitourinary Comments: Breasts  pt declined   Neurological: She is alert and oriented to person, place, and time  No cranial nerve deficit  She exhibits normal muscle tone  Coordination normal    Skin: No rash noted  Psychiatric: She has a normal mood and affect   Her behavior is normal  Judgment and thought content normal

## 2019-01-09 NOTE — PATIENT INSTRUCTIONS
Patient to follow up with her test this  Patient will call to schedule her office visit after her test done    Advised to be seen within 3 weeks

## 2019-04-09 ENCOUNTER — OFFICE VISIT (OUTPATIENT)
Dept: OBGYN CLINIC | Facility: CLINIC | Age: 34
End: 2019-04-09
Payer: COMMERCIAL

## 2019-04-09 VITALS
DIASTOLIC BLOOD PRESSURE: 80 MMHG | SYSTOLIC BLOOD PRESSURE: 120 MMHG | WEIGHT: 275 LBS | BODY MASS INDEX: 45.82 KG/M2 | HEIGHT: 65 IN

## 2019-04-09 DIAGNOSIS — N94.6 DYSMENORRHEA: ICD-10-CM

## 2019-04-09 DIAGNOSIS — Z01.419 ENCNTR FOR GYN EXAM (GENERAL) (ROUTINE) W/O ABN FINDINGS: Primary | ICD-10-CM

## 2019-04-09 PROCEDURE — S0610 ANNUAL GYNECOLOGICAL EXAMINA: HCPCS | Performed by: NURSE PRACTITIONER

## 2019-04-09 RX ORDER — MEFENAMIC ACID 250 MG/1
1 CAPSULE ORAL EVERY 6 HOURS PRN
Qty: 28 EACH | Refills: 6 | Status: SHIPPED | OUTPATIENT
Start: 2019-04-09 | End: 2021-05-07

## 2019-04-14 LAB
HPV HR 12 DNA CVX QL NAA+PROBE: NOT DETECTED
HPV16 DNA SPEC QL NAA+PROBE: NOT DETECTED
HPV18 DNA SPEC QL NAA+PROBE: NOT DETECTED
THIN PREP CVX: NORMAL

## 2019-08-09 ENCOUNTER — TELEPHONE (OUTPATIENT)
Dept: FAMILY MEDICINE CLINIC | Facility: CLINIC | Age: 34
End: 2019-08-09

## 2019-08-20 ENCOUNTER — OFFICE VISIT (OUTPATIENT)
Dept: FAMILY MEDICINE CLINIC | Facility: CLINIC | Age: 34
End: 2019-08-20
Payer: COMMERCIAL

## 2019-08-20 VITALS
SYSTOLIC BLOOD PRESSURE: 140 MMHG | TEMPERATURE: 97.7 F | WEIGHT: 272.6 LBS | DIASTOLIC BLOOD PRESSURE: 90 MMHG | HEART RATE: 59 BPM | BODY MASS INDEX: 45.42 KG/M2 | OXYGEN SATURATION: 96 % | HEIGHT: 65 IN

## 2019-08-20 DIAGNOSIS — R59.0 LYMPHADENOPATHY, CERVICAL: ICD-10-CM

## 2019-08-20 DIAGNOSIS — R16.1 SPLENIC MASS: ICD-10-CM

## 2019-08-20 DIAGNOSIS — R00.1 BRADYCARDIA: ICD-10-CM

## 2019-08-20 DIAGNOSIS — I10 HYPERTENSION, UNSPECIFIED TYPE: ICD-10-CM

## 2019-08-20 DIAGNOSIS — R16.1 SPLENOMEGALY: ICD-10-CM

## 2019-08-20 DIAGNOSIS — F41.9 ANXIETY: Primary | ICD-10-CM

## 2019-08-20 DIAGNOSIS — M25.50 ARTHRALGIA, UNSPECIFIED JOINT: ICD-10-CM

## 2019-08-20 DIAGNOSIS — R82.90 ABNORMAL URINE FINDING: ICD-10-CM

## 2019-08-20 PROCEDURE — 3008F BODY MASS INDEX DOCD: CPT | Performed by: FAMILY MEDICINE

## 2019-08-20 PROCEDURE — 99214 OFFICE O/P EST MOD 30 MIN: CPT | Performed by: FAMILY MEDICINE

## 2019-08-20 RX ORDER — DULOXETIN HYDROCHLORIDE 20 MG/1
20 CAPSULE, DELAYED RELEASE ORAL DAILY
Qty: 30 CAPSULE | Refills: 1 | Status: SHIPPED | OUTPATIENT
Start: 2019-08-20 | End: 2019-10-21 | Stop reason: SDUPTHER
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
## 2019-10-04 ENCOUNTER — APPOINTMENT (OUTPATIENT)
Dept: LAB | Facility: CLINIC | Age: 34
End: 2019-10-04
Payer: COMMERCIAL

## 2019-10-04 ENCOUNTER — OFFICE VISIT (OUTPATIENT)
Dept: LAB | Facility: CLINIC | Age: 34
End: 2019-10-04
Payer: COMMERCIAL

## 2019-10-04 DIAGNOSIS — M25.50 ARTHRALGIA, UNSPECIFIED JOINT: ICD-10-CM

## 2019-10-04 DIAGNOSIS — R16.1 SPLENOMEGALY: ICD-10-CM

## 2019-10-04 DIAGNOSIS — I10 HYPERTENSION, UNSPECIFIED TYPE: ICD-10-CM

## 2019-10-04 DIAGNOSIS — R82.90 ABNORMAL URINE FINDINGS: ICD-10-CM

## 2019-10-04 DIAGNOSIS — F41.9 ANXIETY: ICD-10-CM

## 2019-10-04 DIAGNOSIS — R00.1 BRADYCARDIA: ICD-10-CM

## 2019-10-04 DIAGNOSIS — R59.0 LYMPHADENOPATHY, CERVICAL: ICD-10-CM

## 2019-10-04 LAB
ALBUMIN SERPL BCP-MCNC: 4.2 G/DL (ref 3.5–5)
ALP SERPL-CCNC: 75 U/L (ref 46–116)
ALT SERPL W P-5'-P-CCNC: 23 U/L (ref 12–78)
ANION GAP SERPL CALCULATED.3IONS-SCNC: 11 MMOL/L (ref 4–13)
AST SERPL W P-5'-P-CCNC: 15 U/L (ref 5–45)
BASOPHILS # BLD AUTO: 0.04 THOUSANDS/ΜL (ref 0–0.1)
BASOPHILS NFR BLD AUTO: 1 % (ref 0–1)
BILIRUB SERPL-MCNC: 0.3 MG/DL (ref 0.2–1)
BILIRUB UR QL STRIP: NEGATIVE
BUN SERPL-MCNC: 11 MG/DL (ref 5–25)
CALCIUM SERPL-MCNC: 8.9 MG/DL (ref 8.3–10.1)
CHLORIDE SERPL-SCNC: 103 MMOL/L (ref 100–108)
CLARITY UR: CLEAR
CO2 SERPL-SCNC: 26 MMOL/L (ref 21–32)
COLOR UR: YELLOW
CREAT SERPL-MCNC: 0.74 MG/DL (ref 0.6–1.3)
EOSINOPHIL # BLD AUTO: 0.06 THOUSAND/ΜL (ref 0–0.61)
EOSINOPHIL NFR BLD AUTO: 1 % (ref 0–6)
ERYTHROCYTE [DISTWIDTH] IN BLOOD BY AUTOMATED COUNT: 15.9 % (ref 11.6–15.1)
ERYTHROCYTE [SEDIMENTATION RATE] IN BLOOD: 10 MM/HOUR (ref 0–20)
GFR SERPL CREATININE-BSD FRML MDRD: 108 ML/MIN/1.73SQ M
GLUCOSE SERPL-MCNC: 87 MG/DL (ref 65–140)
GLUCOSE UR STRIP-MCNC: NEGATIVE MG/DL
HCT VFR BLD AUTO: 41.6 % (ref 34.8–46.1)
HGB BLD-MCNC: 13.1 G/DL (ref 11.5–15.4)
HGB UR QL STRIP.AUTO: NEGATIVE
IMM GRANULOCYTES # BLD AUTO: 0.02 THOUSAND/UL (ref 0–0.2)
IMM GRANULOCYTES NFR BLD AUTO: 0 % (ref 0–2)
KETONES UR STRIP-MCNC: NEGATIVE MG/DL
LEUKOCYTE ESTERASE UR QL STRIP: NEGATIVE
LYMPHOCYTES # BLD AUTO: 2.17 THOUSANDS/ΜL (ref 0.6–4.47)
LYMPHOCYTES NFR BLD AUTO: 32 % (ref 14–44)
MCH RBC QN AUTO: 26 PG (ref 26.8–34.3)
MCHC RBC AUTO-ENTMCNC: 31.5 G/DL (ref 31.4–37.4)
MCV RBC AUTO: 83 FL (ref 82–98)
MONOCYTES # BLD AUTO: 0.41 THOUSAND/ΜL (ref 0.17–1.22)
MONOCYTES NFR BLD AUTO: 6 % (ref 4–12)
NEUTROPHILS # BLD AUTO: 4.01 THOUSANDS/ΜL (ref 1.85–7.62)
NEUTS SEG NFR BLD AUTO: 60 % (ref 43–75)
NITRITE UR QL STRIP: NEGATIVE
NRBC BLD AUTO-RTO: 0 /100 WBCS
PH UR STRIP.AUTO: 7 [PH]
PLATELET # BLD AUTO: 244 THOUSANDS/UL (ref 149–390)
PMV BLD AUTO: 11.5 FL (ref 8.9–12.7)
POTASSIUM SERPL-SCNC: 3.7 MMOL/L (ref 3.5–5.3)
PROT SERPL-MCNC: 7.9 G/DL (ref 6.4–8.2)
PROT UR STRIP-MCNC: NEGATIVE MG/DL
RBC # BLD AUTO: 5.03 MILLION/UL (ref 3.81–5.12)
RHEUMATOID FACT SER QL LA: NEGATIVE
SODIUM SERPL-SCNC: 140 MMOL/L (ref 136–145)
SP GR UR STRIP.AUTO: 1.01 (ref 1–1.03)
TSH SERPL DL<=0.05 MIU/L-ACNC: 1.97 UIU/ML (ref 0.36–3.74)
URATE SERPL-MCNC: 5.2 MG/DL (ref 2–6.8)
UROBILINOGEN UR QL STRIP.AUTO: 0.2 E.U./DL
WBC # BLD AUTO: 6.71 THOUSAND/UL (ref 4.31–10.16)

## 2019-10-04 PROCEDURE — 86430 RHEUMATOID FACTOR TEST QUAL: CPT

## 2019-10-04 PROCEDURE — 87086 URINE CULTURE/COLONY COUNT: CPT

## 2019-10-04 PROCEDURE — 93005 ELECTROCARDIOGRAM TRACING: CPT

## 2019-10-04 PROCEDURE — 36415 COLL VENOUS BLD VENIPUNCTURE: CPT

## 2019-10-04 PROCEDURE — 81003 URINALYSIS AUTO W/O SCOPE: CPT | Performed by: FAMILY MEDICINE

## 2019-10-04 PROCEDURE — 85652 RBC SED RATE AUTOMATED: CPT

## 2019-10-04 PROCEDURE — 86200 CCP ANTIBODY: CPT

## 2019-10-04 PROCEDURE — 80053 COMPREHEN METABOLIC PANEL: CPT

## 2019-10-04 PROCEDURE — 84443 ASSAY THYROID STIM HORMONE: CPT

## 2019-10-04 PROCEDURE — 84550 ASSAY OF BLOOD/URIC ACID: CPT

## 2019-10-04 PROCEDURE — 85025 COMPLETE CBC W/AUTO DIFF WBC: CPT

## 2019-10-04 PROCEDURE — 86618 LYME DISEASE ANTIBODY: CPT

## 2019-10-04 PROCEDURE — 86038 ANTINUCLEAR ANTIBODIES: CPT

## 2019-10-05 LAB
B BURGDOR IGG+IGM SER-ACNC: <0.91 ISR (ref 0–0.9)
BACTERIA UR CULT: NORMAL

## 2019-10-06 LAB
ATRIAL RATE: 62 BPM
CCP IGA+IGG SERPL IA-ACNC: 7 UNITS (ref 0–19)
P AXIS: 35 DEGREES
PR INTERVAL: 148 MS
QRS AXIS: 32 DEGREES
QRSD INTERVAL: 110 MS
QT INTERVAL: 412 MS
QTC INTERVAL: 418 MS
T WAVE AXIS: 29 DEGREES
VENTRICULAR RATE: 62 BPM

## 2019-10-06 PROCEDURE — 93010 ELECTROCARDIOGRAM REPORT: CPT | Performed by: INTERNAL MEDICINE

## 2019-10-07 LAB — RYE IGE QN: NEGATIVE

## 2019-10-17 ENCOUNTER — TELEPHONE (OUTPATIENT)
Dept: FAMILY MEDICINE CLINIC | Facility: CLINIC | Age: 34
End: 2019-10-17

## 2019-10-17 DIAGNOSIS — R79.9 ABNORMAL BLOOD CELL COUNT: Primary | ICD-10-CM

## 2019-10-18 NOTE — TELEPHONE ENCOUNTER
Patient did not follow up  Urine culture and UA   negative   CBC slightly abnormal, check iron study  Sed rate, thyroid test, rheumatoid factor, uric acid, Lyme titer, TRINITY, all okay  Check iron study

## 2019-10-21 DIAGNOSIS — M25.50 ARTHRALGIA, UNSPECIFIED JOINT: ICD-10-CM

## 2019-10-21 DIAGNOSIS — F41.9 ANXIETY: ICD-10-CM

## 2019-10-21 RX ORDER — DULOXETIN HYDROCHLORIDE 20 MG/1
20 CAPSULE, DELAYED RELEASE ORAL DAILY
Qty: 30 CAPSULE | Refills: 1 | Status: SHIPPED | OUTPATIENT
Start: 2019-10-21 | End: 2021-05-07 | Stop reason: ALTCHOICE

## 2019-12-21 ENCOUNTER — APPOINTMENT (OUTPATIENT)
Dept: URGENT CARE | Facility: CLINIC | Age: 34
End: 2019-12-21

## 2019-12-21 ENCOUNTER — TRANSCRIBE ORDERS (OUTPATIENT)
Dept: URGENT CARE | Facility: CLINIC | Age: 34
End: 2019-12-21

## 2019-12-21 DIAGNOSIS — Z02.1 PRE-EMPLOYMENT EXAMINATION: ICD-10-CM

## 2019-12-21 DIAGNOSIS — Z02.1 PRE-EMPLOYMENT EXAMINATION: Primary | ICD-10-CM

## 2019-12-21 LAB
HBV SURFACE AB SER-ACNC: 51.09 MIU/ML
RUBV IGG SERPL IA-ACNC: >175 IU/ML

## 2019-12-21 PROCEDURE — 86762 RUBELLA ANTIBODY: CPT | Performed by: NURSE PRACTITIONER

## 2019-12-21 PROCEDURE — 86706 HEP B SURFACE ANTIBODY: CPT | Performed by: NURSE PRACTITIONER

## 2019-12-21 PROCEDURE — 86787 VARICELLA-ZOSTER ANTIBODY: CPT | Performed by: NURSE PRACTITIONER

## 2019-12-21 PROCEDURE — 86735 MUMPS ANTIBODY: CPT | Performed by: NURSE PRACTITIONER

## 2019-12-21 PROCEDURE — 86765 RUBEOLA ANTIBODY: CPT | Performed by: NURSE PRACTITIONER

## 2019-12-21 PROCEDURE — 86480 TB TEST CELL IMMUN MEASURE: CPT | Performed by: NURSE PRACTITIONER

## 2019-12-23 LAB
GAMMA INTERFERON BACKGROUND BLD IA-ACNC: 0.06 IU/ML
M TB IFN-G BLD-IMP: NEGATIVE
M TB IFN-G CD4+ BCKGRND COR BLD-ACNC: -0.03 IU/ML
M TB IFN-G CD4+ BCKGRND COR BLD-ACNC: 0 IU/ML
MITOGEN IGNF BCKGRD COR BLD-ACNC: 9.08 IU/ML
VZV IGG SER IA-ACNC: NORMAL

## 2019-12-24 LAB
MEV IGG SER QL: NORMAL
MUV IGG SER QL: NORMAL

## 2020-09-02 ENCOUNTER — TELEPHONE (OUTPATIENT)
Dept: FAMILY MEDICINE CLINIC | Facility: CLINIC | Age: 35
End: 2020-09-02

## 2020-09-02 DIAGNOSIS — Z91.89 AT RISK FOR SLEEP APNEA: Primary | ICD-10-CM

## 2020-09-02 NOTE — TELEPHONE ENCOUNTER
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

## 2020-09-03 ENCOUNTER — TELEPHONE (OUTPATIENT)
Dept: FAMILY MEDICINE CLINIC | Facility: CLINIC | Age: 35
End: 2020-09-03

## 2020-09-03 DIAGNOSIS — R06.83 SNORING: ICD-10-CM

## 2020-09-03 DIAGNOSIS — Z91.89 AT RISK FOR SLEEP APNEA: Primary | ICD-10-CM

## 2020-09-03 NOTE — TELEPHONE ENCOUNTER
Patient called in sting that she needed a Sleep study for her [employer] and she was asking for this study due to her snoring

## 2020-12-23 ENCOUNTER — TELEMEDICINE (OUTPATIENT)
Dept: FAMILY MEDICINE CLINIC | Facility: CLINIC | Age: 35
End: 2020-12-23
Payer: COMMERCIAL

## 2020-12-23 VITALS — TEMPERATURE: 97.7 F

## 2020-12-23 DIAGNOSIS — R09.81 MILD NASAL CONGESTION: ICD-10-CM

## 2020-12-23 DIAGNOSIS — Z20.822 EXPOSURE TO COVID-19 VIRUS: Primary | ICD-10-CM

## 2020-12-23 DIAGNOSIS — R51.9 ACUTE NONINTRACTABLE HEADACHE, UNSPECIFIED HEADACHE TYPE: ICD-10-CM

## 2020-12-23 PROCEDURE — U0003 INFECTIOUS AGENT DETECTION BY NUCLEIC ACID (DNA OR RNA); SEVERE ACUTE RESPIRATORY SYNDROME CORONAVIRUS 2 (SARS-COV-2) (CORONAVIRUS DISEASE [COVID-19]), AMPLIFIED PROBE TECHNIQUE, MAKING USE OF HIGH THROUGHPUT TECHNOLOGIES AS DESCRIBED BY CMS-2020-01-R: HCPCS | Performed by: FAMILY MEDICINE

## 2020-12-23 PROCEDURE — 99213 OFFICE O/P EST LOW 20 MIN: CPT | Performed by: FAMILY MEDICINE

## 2020-12-24 ENCOUNTER — PATIENT MESSAGE (OUTPATIENT)
Dept: FAMILY MEDICINE CLINIC | Facility: CLINIC | Age: 35
End: 2020-12-24

## 2020-12-24 LAB — SARS-COV-2 RNA SPEC QL NAA+PROBE: NOT DETECTED

## 2020-12-26 ENCOUNTER — TELEMEDICINE (OUTPATIENT)
Dept: FAMILY MEDICINE CLINIC | Facility: CLINIC | Age: 35
End: 2020-12-26
Payer: COMMERCIAL

## 2020-12-26 ENCOUNTER — TELEPHONE (OUTPATIENT)
Dept: OTHER | Facility: OTHER | Age: 35
End: 2020-12-26

## 2020-12-26 DIAGNOSIS — B34.9 VIRAL INFECTION, UNSPECIFIED: ICD-10-CM

## 2020-12-26 DIAGNOSIS — Z20.822 EXPOSURE TO COVID-19 VIRUS: Primary | ICD-10-CM

## 2020-12-26 DIAGNOSIS — J98.8 CONGESTION OF UPPER AIRWAY: ICD-10-CM

## 2020-12-26 PROCEDURE — 99213 OFFICE O/P EST LOW 20 MIN: CPT | Performed by: FAMILY MEDICINE

## 2020-12-28 ENCOUNTER — TELEPHONE (OUTPATIENT)
Dept: FAMILY MEDICINE CLINIC | Facility: CLINIC | Age: 35
End: 2020-12-28

## 2020-12-29 DIAGNOSIS — J98.8 CONGESTION OF UPPER AIRWAY: ICD-10-CM

## 2020-12-29 DIAGNOSIS — Z20.822 EXPOSURE TO COVID-19 VIRUS: ICD-10-CM

## 2020-12-29 PROCEDURE — U0003 INFECTIOUS AGENT DETECTION BY NUCLEIC ACID (DNA OR RNA); SEVERE ACUTE RESPIRATORY SYNDROME CORONAVIRUS 2 (SARS-COV-2) (CORONAVIRUS DISEASE [COVID-19]), AMPLIFIED PROBE TECHNIQUE, MAKING USE OF HIGH THROUGHPUT TECHNOLOGIES AS DESCRIBED BY CMS-2020-01-R: HCPCS | Performed by: FAMILY MEDICINE

## 2020-12-30 LAB — SARS-COV-2 RNA SPEC QL NAA+PROBE: NOT DETECTED

## 2021-03-10 DIAGNOSIS — Z23 ENCOUNTER FOR IMMUNIZATION: ICD-10-CM

## 2021-03-28 ENCOUNTER — IMMUNIZATIONS (OUTPATIENT)
Dept: FAMILY MEDICINE CLINIC | Facility: HOSPITAL | Age: 36
End: 2021-03-28

## 2021-03-28 DIAGNOSIS — Z23 ENCOUNTER FOR IMMUNIZATION: Primary | ICD-10-CM

## 2021-03-28 PROCEDURE — 91300 SARS-COV-2 / COVID-19 MRNA VACCINE (PFIZER-BIONTECH) 30 MCG: CPT

## 2021-03-28 PROCEDURE — 0001A SARS-COV-2 / COVID-19 MRNA VACCINE (PFIZER-BIONTECH) 30 MCG: CPT

## 2021-04-18 ENCOUNTER — IMMUNIZATIONS (OUTPATIENT)
Dept: FAMILY MEDICINE CLINIC | Facility: HOSPITAL | Age: 36
End: 2021-04-18

## 2021-04-18 DIAGNOSIS — Z23 ENCOUNTER FOR IMMUNIZATION: Primary | ICD-10-CM

## 2021-04-18 PROCEDURE — 91300 SARS-COV-2 / COVID-19 MRNA VACCINE (PFIZER-BIONTECH) 30 MCG: CPT

## 2021-04-18 PROCEDURE — 0002A SARS-COV-2 / COVID-19 MRNA VACCINE (PFIZER-BIONTECH) 30 MCG: CPT

## 2021-05-07 ENCOUNTER — APPOINTMENT (OUTPATIENT)
Dept: LAB | Facility: CLINIC | Age: 36
End: 2021-05-07
Payer: COMMERCIAL

## 2021-05-07 ENCOUNTER — OFFICE VISIT (OUTPATIENT)
Dept: FAMILY MEDICINE CLINIC | Facility: CLINIC | Age: 36
End: 2021-05-07
Payer: COMMERCIAL

## 2021-05-07 VITALS
WEIGHT: 257.6 LBS | BODY MASS INDEX: 42.92 KG/M2 | DIASTOLIC BLOOD PRESSURE: 82 MMHG | TEMPERATURE: 97.7 F | SYSTOLIC BLOOD PRESSURE: 134 MMHG | HEART RATE: 54 BPM | OXYGEN SATURATION: 97 % | HEIGHT: 65 IN

## 2021-05-07 DIAGNOSIS — E66.01 CLASS 3 SEVERE OBESITY WITHOUT SERIOUS COMORBIDITY WITH BODY MASS INDEX (BMI) OF 40.0 TO 44.9 IN ADULT, UNSPECIFIED OBESITY TYPE (HCC): ICD-10-CM

## 2021-05-07 DIAGNOSIS — R63.4 WEIGHT LOSS: ICD-10-CM

## 2021-05-07 DIAGNOSIS — D18.01 HEMANGIOMA OF SKIN: ICD-10-CM

## 2021-05-07 DIAGNOSIS — Z00.00 WELL ADULT EXAM: Primary | ICD-10-CM

## 2021-05-07 DIAGNOSIS — R58 ECCHYMOSIS: ICD-10-CM

## 2021-05-07 DIAGNOSIS — R00.1 BRADYCARDIA: ICD-10-CM

## 2021-05-07 DIAGNOSIS — D73.89 LESION OF SPLEEN: ICD-10-CM

## 2021-05-07 DIAGNOSIS — R59.0 LYMPHADENOPATHY, CERVICAL: ICD-10-CM

## 2021-05-07 DIAGNOSIS — R16.1 SPLEEN ENLARGEMENT: ICD-10-CM

## 2021-05-07 LAB
ALBUMIN SERPL BCP-MCNC: 4.2 G/DL (ref 3.5–5)
ALP SERPL-CCNC: 66 U/L (ref 46–116)
ALT SERPL W P-5'-P-CCNC: 28 U/L (ref 12–78)
ANION GAP SERPL CALCULATED.3IONS-SCNC: 4 MMOL/L (ref 4–13)
APTT PPP: 35 SECONDS (ref 23–37)
AST SERPL W P-5'-P-CCNC: 16 U/L (ref 5–45)
BASOPHILS # BLD AUTO: 0.05 THOUSANDS/ΜL (ref 0–0.1)
BASOPHILS NFR BLD AUTO: 1 % (ref 0–1)
BILIRUB SERPL-MCNC: 0.73 MG/DL (ref 0.2–1)
BILIRUB UR QL STRIP: NEGATIVE
BUN SERPL-MCNC: 10 MG/DL (ref 5–25)
CALCIUM SERPL-MCNC: 9.6 MG/DL (ref 8.3–10.1)
CHLORIDE SERPL-SCNC: 112 MMOL/L (ref 100–108)
CHOLEST SERPL-MCNC: 151 MG/DL (ref 50–200)
CLARITY UR: CLEAR
CO2 SERPL-SCNC: 25 MMOL/L (ref 21–32)
COLOR UR: YELLOW
CREAT SERPL-MCNC: 0.7 MG/DL (ref 0.6–1.3)
EOSINOPHIL # BLD AUTO: 0.08 THOUSAND/ΜL (ref 0–0.61)
EOSINOPHIL NFR BLD AUTO: 1 % (ref 0–6)
ERYTHROCYTE [DISTWIDTH] IN BLOOD BY AUTOMATED COUNT: 15.1 % (ref 11.6–15.1)
GFR SERPL CREATININE-BSD FRML MDRD: 114 ML/MIN/1.73SQ M
GLUCOSE P FAST SERPL-MCNC: 92 MG/DL (ref 65–99)
GLUCOSE UR STRIP-MCNC: NEGATIVE MG/DL
HCT VFR BLD AUTO: 44.5 % (ref 34.8–46.1)
HDLC SERPL-MCNC: 78 MG/DL
HGB BLD-MCNC: 14 G/DL (ref 11.5–15.4)
HGB UR QL STRIP.AUTO: NEGATIVE
IMM GRANULOCYTES # BLD AUTO: 0.02 THOUSAND/UL (ref 0–0.2)
IMM GRANULOCYTES NFR BLD AUTO: 0 % (ref 0–2)
INR PPP: 1.03 (ref 0.84–1.19)
KETONES UR STRIP-MCNC: NEGATIVE MG/DL
LDLC SERPL CALC-MCNC: 64 MG/DL (ref 0–100)
LEUKOCYTE ESTERASE UR QL STRIP: NEGATIVE
LYMPHOCYTES # BLD AUTO: 1.73 THOUSANDS/ΜL (ref 0.6–4.47)
LYMPHOCYTES NFR BLD AUTO: 26 % (ref 14–44)
MCH RBC QN AUTO: 26.9 PG (ref 26.8–34.3)
MCHC RBC AUTO-ENTMCNC: 31.5 G/DL (ref 31.4–37.4)
MCV RBC AUTO: 86 FL (ref 82–98)
MONOCYTES # BLD AUTO: 0.53 THOUSAND/ΜL (ref 0.17–1.22)
MONOCYTES NFR BLD AUTO: 8 % (ref 4–12)
NEUTROPHILS # BLD AUTO: 4.17 THOUSANDS/ΜL (ref 1.85–7.62)
NEUTS SEG NFR BLD AUTO: 64 % (ref 43–75)
NITRITE UR QL STRIP: NEGATIVE
NRBC BLD AUTO-RTO: 0 /100 WBCS
PH UR STRIP.AUTO: 7 [PH]
PLATELET # BLD AUTO: 214 THOUSANDS/UL (ref 149–390)
PMV BLD AUTO: 13.4 FL (ref 8.9–12.7)
POTASSIUM SERPL-SCNC: 4.6 MMOL/L (ref 3.5–5.3)
PROT SERPL-MCNC: 7.7 G/DL (ref 6.4–8.2)
PROT UR STRIP-MCNC: NEGATIVE MG/DL
PROTHROMBIN TIME: 13.5 SECONDS (ref 11.6–14.5)
RBC # BLD AUTO: 5.2 MILLION/UL (ref 3.81–5.12)
SODIUM SERPL-SCNC: 141 MMOL/L (ref 136–145)
SP GR UR STRIP.AUTO: 1.02 (ref 1–1.03)
TRIGL SERPL-MCNC: 47 MG/DL
TSH SERPL DL<=0.05 MIU/L-ACNC: 1.79 UIU/ML (ref 0.36–3.74)
UROBILINOGEN UR QL STRIP.AUTO: 0.2 E.U./DL
WBC # BLD AUTO: 6.58 THOUSAND/UL (ref 4.31–10.16)

## 2021-05-07 PROCEDURE — 85025 COMPLETE CBC W/AUTO DIFF WBC: CPT

## 2021-05-07 PROCEDURE — 84443 ASSAY THYROID STIM HORMONE: CPT

## 2021-05-07 PROCEDURE — 99395 PREV VISIT EST AGE 18-39: CPT | Performed by: FAMILY MEDICINE

## 2021-05-07 PROCEDURE — 80061 LIPID PANEL: CPT

## 2021-05-07 PROCEDURE — 85730 THROMBOPLASTIN TIME PARTIAL: CPT

## 2021-05-07 PROCEDURE — 36415 COLL VENOUS BLD VENIPUNCTURE: CPT

## 2021-05-07 PROCEDURE — 85610 PROTHROMBIN TIME: CPT

## 2021-05-07 PROCEDURE — 80053 COMPREHEN METABOLIC PANEL: CPT

## 2021-05-07 PROCEDURE — 81003 URINALYSIS AUTO W/O SCOPE: CPT | Performed by: FAMILY MEDICINE

## 2021-05-07 NOTE — PROGRESS NOTES
Assessment/Plan:       No problem-specific Assessment & Plan notes found for this encounter  Diagnoses and all orders for this visit:    Well adult exam    Weight loss  Comments:  Patient to call if any further weight loss  Orders:  -     TSH, 3rd generation with Free T4 reflex; Future  -     Comprehensive metabolic panel; Future  -     CBC and differential; Future  -     UA w Reflex to Microscopic w Reflex to Culture  -     XR chest pa & lateral; Future    Ecchymosis  Comments:  Questionable to CT being boxes  Patient to call if she developed ecchymosis somewhere else of her body  Orders:  -     Comprehensive metabolic panel; Future  -     CBC and differential; Future  -     APTT; Future  -     Protime-INR; Future    Hemangioma of skin    Class 3 severe obesity without serious comorbidity with body mass index (BMI) of 40 0 to 44 9 in adult, unspecified obesity type ([facility] [address] )  -     Lipid Panel with Direct LDL reflex; Future    Lymphadenopathy, cervical  -     CT soft tissue neck w contrast; Future    Spleen enlargement    Lesion of spleen  -     CT abdomen pelvis w contrast; Future    Bradycardia  Comments:  Chronic and stable  Patient is asymptomatic  Orders:  -     ECG 12 lead; Future        Patient Instructions   Follow-up with test results      Orders Placed This Encounter   Procedures    CT abdomen pelvis w contrast     Standing Status:   Future     Standing Expiration Date:   5/7/2025     Scheduling Instructions: For procedures with both oral (PO) and IV contrast:            The patient will need to drink barium for this test  Barium needs to be picked up in the registration area at least one day prior to your study  For out of network (non-[facility]) orders please bring your prescription when picking up oral contrast  For AM Appointments: Drink one bottle of barium before bed time the evening before your scheduled test   Drink 1/2 of the second bottle one hour prior to your test  Please bring other 1/2 bottle with you to drink at the time of your study  For PM Appointments: Drink one bottle of barium before 9:00am on the day of your test  Drink 1/2 of the second bottle one hour prior to your test  Please bring other 1/2 bottle with you to drink at the time of your study  Nothing to eat 3 hours prior to your test  In addition to the barium, clear liquids are also permitted up until the time of the scan  Clear liquids includes water, black coffee or tea, apple juice or clear broth  If possible wear clothing without any metal in the abdomen area  Sweat suit,       sports bra or bra without underwire may eliminate the need to change  Please bring your insurance cards, a form of photo ID and a list of your medications with you  Arrive 15 minutes prior to your appointment time in order to register  On the day of your test, please bring any prior CT or MRI studies of this area with you that were not performed at a [facility]  For procedures with ONLY IV contrast:            Nothing to eat 3 hours prior to your test  Clear liquids are permitted up until the time of the scan  Clear liquids includes water, black coffee or tea, apple juice or clear broth  If possible wear clothing without any metal in the abdomen area  Sweat suit, sports bra or bra without underwire may eliminate the need to change  Please bring your insurance cards, a form of photo ID and a list of your medications with you  Arrive 15 minutes prior to your appointment time in order to register  On the day of your test, please bring any prior CT or MRI studies of this area with you that were not performed at a [facility]  To schedule this appointment, please contact Central Scheduling at [phone]  Order Specific Question:   Is the patient pregnant? Answer:   Unknown     Order Specific Question:   What is the patient's sedation requirement?      Answer:   No Sedation     Order Specific Question:   Contrast information:     Answer:   PO & IV     Order Specific Question:   Did the patient ever have a reaction to x-ray dye? If yes, please verify the type of allergy and order the contrast allergy prep  Answer:   No     Order Specific Question:   Release to patient through Mychart     Answer:   Immediate     Order Specific Question:   Reason for Exam (FREE TEXT)     Answer:   spleen lesion  ,weight loss    CT soft tissue neck w contrast     Standing Status:   Future     Standing Expiration Date:   5/7/2025     Scheduling Instructions:      Nothing to eat 3 hours prior to this test   We encourage you to drink clear liquid up until the time of your study  Please bring your insurance cards, a form of photo ID and a list of your medications with you  Arrive 15 minutes prior to your appointment time to register  On the day of your test, please bring any prior CT or MRI studies of this area with you that were not performed at a [facility]  To schedule this appointment, please contact Central Scheduling at [phone]  Order Specific Question:   Is the patient pregnant? Answer:   Unknown     Order Specific Question:   What is the patient's sedation requirement? Answer:   No Sedation     Order Specific Question:   Contrast information:     Answer:   IV     Order Specific Question:   Did the patient ever have a reaction to x-ray dye? If yes, please verify the type of allergy and order the contrast allergy prep  Answer:   No     Order Specific Question:   Release to patient through Mychart     Answer:   Immediate     Order Specific Question:   Reason for Exam (FREE TEXT)     Answer:   enlarged lymph nodes , weight  loss    XR chest pa & lateral     Standing Status:   Future     Standing Expiration Date:   5/7/2022     Scheduling Instructions:      Bring along any outside films relating to this procedure             Order Specific Question:   Reason for Exam: Answer:   weight loss     Order Specific Question:   Is the patient pregnant? Answer:   Unknown    TSH, 3rd generation with Free T4 reflex     Standing Status:   Future     Number of Occurrences:   1     Standing Expiration Date:   5/7/2022    Lipid Panel with Direct LDL reflex     This is a patient instruction: This test requires patient fasting for 10-12 hours or longer  Drinking of black coffee or black tea is acceptable  Standing Status:   Future     Number of Occurrences:   1     Standing Expiration Date:   5/7/2022    Comprehensive metabolic panel     This is a patient instruction: Patient fasting for 8 hours or longer recommended  Standing Status:   Future     Number of Occurrences:   1     Standing Expiration Date:   5/7/2022    CBC and differential     This is a patient instruction: This test is non-fasting  Please drink two glasses of water morning of bloodwork  Standing Status:   Future     Number of Occurrences:   1     Standing Expiration Date:   5/7/2022    APTT     Standing Status:   Future     Number of Occurrences:   1     Standing Expiration Date:   5/7/2022    Protime-INR     Standing Status:   Future     Number of Occurrences:   1     Standing Expiration Date:   5/7/2022    UA w Reflex to Microscopic w Reflex to Culture    ECG 12 lead     Standing Status:   Future     Standing Expiration Date:   5/7/2022         Subjective:     Patient ID: Matthew Humphries is a 37 y o  female      HPI  Bruises  2 months  Red spot  2 months  Weight  Loss , 3-4 m   [place]  2015 had holter  Well adult exam  Feels well overall  Denied anxiety, depression  Does not smoke  Drink alcohol or does drugs  GYN exam was last year  Breast manual exam done Lat year   Dental   Last year , she is planning to see her dentist soon  Eye care   had recent eye exam, she wears glasses   MRI abd Neck Ct scan , not done  Review of Systems   Constitutional: Positive for unexpected weight change   Negative for activity change, appetite change, chills, fatigue and fever  HENT: Negative for congestion, ear discharge, ear pain, hearing loss, nosebleeds, rhinorrhea, sinus pressure, sore throat, tinnitus, trouble swallowing and voice change  Eyes: Negative for photophobia, pain, discharge and visual disturbance  Respiratory: Negative for cough, chest tightness, shortness of breath and wheezing  Cardiovascular: Negative for chest pain, palpitations and leg swelling  Gastrointestinal: Negative for abdominal distention, abdominal pain, anal bleeding, blood in stool, constipation, diarrhea, nausea and vomiting  Endocrine: Negative for cold intolerance, heat intolerance, polydipsia and polyuria  Genitourinary: Negative for dysuria, frequency, hematuria and urgency  Musculoskeletal: Negative for arthralgias, back pain, gait problem, joint swelling, myalgias and neck pain  Skin: Negative for color change, pallor and rash  Neurological: Negative for dizziness, tremors, seizures, syncope, weakness, light-headedness and headaches  Hematological: Positive for adenopathy  Bruises/bleeds easily  Psychiatric/Behavioral: Negative for agitation, behavioral problems, confusion, dysphoric mood, hallucinations, self-injury and sleep disturbance  The patient is not nervous/anxious and is not hyperactive  Objective:     Physical Exam  Constitutional:       General: She is not in acute distress  Appearance: Normal appearance  She is well-developed  She is not ill-appearing, toxic-appearing or diaphoretic  HENT:      Head: Normocephalic and atraumatic  Right Ear: Tympanic membrane, ear canal and external ear normal       Left Ear: Tympanic membrane, ear canal and external ear normal       Nose:      Comments: Nose, and throat  Not examined  Patient has a mask  Due to COVID  Eyes:      General: No scleral icterus  Right eye: No discharge  Left eye: No discharge  Conjunctiva/sclera: Conjunctivae normal       Pupils: Pupils are equal, round, and reactive to light  Neck:      Musculoskeletal: Neck supple  No neck rigidity  Thyroid: No thyromegaly  Vascular: No JVD  Comments: There is 1 enlarged lymph node the mandible bilaterally about 1 cm each not tender to touch  Cardiovascular:      Rate and Rhythm: Normal rate and regular rhythm  Pulses:           Carotid pulses are 3+ on the right side and 3+ on the left side  Dorsalis pedis pulses are 3+ on the right side and 3+ on the left side  Posterior tibial pulses are 3+ on the right side and 3+ on the left side  Heart sounds: Normal heart sounds  No murmur  Pulmonary:      Effort: Pulmonary effort is normal  No respiratory distress  Breath sounds: Normal breath sounds  No stridor  No wheezing or rales  Abdominal:      General: Bowel sounds are normal  There is no distension  Palpations: Abdomen is soft  There is no mass  Tenderness: There is no abdominal tenderness  There is no guarding or rebound  Hernia: No hernia is present  Musculoskeletal: Normal range of motion  General: No deformity  Right lower leg: No edema  Left lower leg: No edema  Skin:     Coloration: Skin is not jaundiced or pale  Findings: No erythema or rash  Comments: Positive ago with is at both forearm  One on each forearm about an inch and half by 1 initial half on the right forearm and smaller on the left forearm  There is a few superficial tiny red spots less than 1 8 mm each on the upper right arm  There to elevated to around red lesion at the mid of the chest about 1-2 mm  Neurological:      General: No focal deficit present  Mental Status: She is alert and oriented to person, place, and time  Cranial Nerves: No cranial nerve deficit  Sensory: No sensory deficit  Motor: No weakness or abnormal muscle tone        Coordination: Coordination normal       Gait: Gait normal       Deep Tendon Reflexes: Reflexes normal       Comments: Babinski is negative bilaterally  Negative Romberg  Psychiatric:         Mood and Affect: Mood normal          Behavior: Behavior normal          Thought Content: Thought content normal          Judgment: Judgment normal       Comments: smiling        BMI Counseling: Body mass index is 42 87 kg/m²  The BMI is above normal  Exercise recommendations include strength training exercises

## 2021-05-07 NOTE — PROGRESS NOTES
Assessment/Plan:       No problem-specific Assessment & Plan notes found for this encounter  Diagnoses and all orders for this visit:    Well adult exam    Weight loss  Comments:  Patient to call if any further weight loss  Orders:  -     TSH, 3rd generation with Free T4 reflex; Future  -     Comprehensive metabolic panel; Future  -     CBC and differential; Future  -     UA w Reflex to Microscopic w Reflex to Culture  -     XR chest pa & lateral; Future    Ecchymosis  Comments:  Questionable to CT being boxes  Patient to call if she developed ecchymosis somewhere else of her body  Orders:  -     Comprehensive metabolic panel; Future  -     CBC and differential; Future  -     APTT; Future  -     Protime-INR; Future    Hemangioma of skin    Class 3 severe obesity without serious comorbidity with body mass index (BMI) of 40 0 to 44 9 in adult, unspecified obesity type ([facility] [address] )  -     Lipid Panel with Direct LDL reflex; Future    Lymphadenopathy, cervical  -     CT soft tissue neck w contrast; Future    Spleen enlargement    Lesion of spleen  -     CT abdomen pelvis w contrast; Future    Bradycardia  Comments:  Chronic and stable  Patient is asymptomatic  Orders:  -     ECG 12 lead; Future        Patient Instructions   Follow-up with test results      Orders Placed This Encounter   Procedures    CT abdomen pelvis w contrast     Standing Status:   Future     Standing Expiration Date:   5/7/2025     Scheduling Instructions: For procedures with both oral (PO) and IV contrast:            The patient will need to drink barium for this test  Barium needs to be picked up in the registration area at least one day prior to your study  For out of network (non-[facility]) orders please bring your prescription when picking up oral contrast  For AM Appointments: Drink one bottle of barium before bed time the evening before your scheduled test   Drink 1/2 of the second bottle one hour prior to your test  Please bring other 1/2 bottle with you to drink at the time of your study  For PM Appointments: Drink one bottle of barium before 9:00am on the day of your test  Drink 1/2 of the second bottle one hour prior to your test  Please bring other 1/2 bottle with you to drink at the time of your study  Nothing to eat 3 hours prior to your test  In addition to the barium, clear liquids are also permitted up until the time of the scan  Clear liquids includes water, black coffee or tea, apple juice or clear broth  If possible wear clothing without any metal in the abdomen area  Sweat suit,       sports bra or bra without underwire may eliminate the need to change  Please bring your insurance cards, a form of photo ID and a list of your medications with you  Arrive 15 minutes prior to your appointment time in order to register  On the day of your test, please bring any prior CT or MRI studies of this area with you that were not performed at a [facility]  For procedures with ONLY IV contrast:            Nothing to eat 3 hours prior to your test  Clear liquids are permitted up until the time of the scan  Clear liquids includes water, black coffee or tea, apple juice or clear broth  If possible wear clothing without any metal in the abdomen area  Sweat suit, sports bra or bra without underwire may eliminate the need to change  Please bring your insurance cards, a form of photo ID and a list of your medications with you  Arrive 15 minutes prior to your appointment time in order to register  On the day of your test, please bring any prior CT or MRI studies of this area with you that were not performed at a [facility]  To schedule this appointment, please contact Central Scheduling at [phone]  Order Specific Question:   Is the patient pregnant? Answer:   Unknown     Order Specific Question:   What is the patient's sedation requirement?      Answer:   No Sedation     Order Specific Question:   Contrast information:     Answer:   PO & IV     Order Specific Question:   Did the patient ever have a reaction to x-ray dye? If yes, please verify the type of allergy and order the contrast allergy prep  Answer:   No     Order Specific Question:   Release to patient through Mychart     Answer:   Immediate     Order Specific Question:   Reason for Exam (FREE TEXT)     Answer:   spleen lesion  ,weight loss    CT soft tissue neck w contrast     Standing Status:   Future     Standing Expiration Date:   5/7/2025     Scheduling Instructions:      Nothing to eat 3 hours prior to this test   We encourage you to drink clear liquid up until the time of your study  Please bring your insurance cards, a form of photo ID and a list of your medications with you  Arrive 15 minutes prior to your appointment time to register  On the day of your test, please bring any prior CT or MRI studies of this area with you that were not performed at a [facility]  To schedule this appointment, please contact Central Scheduling at [phone]  Order Specific Question:   Is the patient pregnant? Answer:   Unknown     Order Specific Question:   What is the patient's sedation requirement? Answer:   No Sedation     Order Specific Question:   Contrast information:     Answer:   IV     Order Specific Question:   Did the patient ever have a reaction to x-ray dye? If yes, please verify the type of allergy and order the contrast allergy prep  Answer:   No     Order Specific Question:   Release to patient through Mychart     Answer:   Immediate     Order Specific Question:   Reason for Exam (FREE TEXT)     Answer:   enlarged lymph nodes , weight  loss    XR chest pa & lateral     Standing Status:   Future     Standing Expiration Date:   5/7/2022     Scheduling Instructions:      Bring along any outside films relating to this procedure             Order Specific Question:   Reason for Exam: Answer:   weight loss     Order Specific Question:   Is the patient pregnant? Answer:   Unknown    TSH, 3rd generation with Free T4 reflex     Standing Status:   Future     Number of Occurrences:   1     Standing Expiration Date:   5/7/2022    Lipid Panel with Direct LDL reflex     This is a patient instruction: This test requires patient fasting for 10-12 hours or longer  Drinking of black coffee or black tea is acceptable  Standing Status:   Future     Number of Occurrences:   1     Standing Expiration Date:   5/7/2022    Comprehensive metabolic panel     This is a patient instruction: Patient fasting for 8 hours or longer recommended  Standing Status:   Future     Number of Occurrences:   1     Standing Expiration Date:   5/7/2022    CBC and differential     This is a patient instruction: This test is non-fasting  Please drink two glasses of water morning of bloodwork  Standing Status:   Future     Number of Occurrences:   1     Standing Expiration Date:   5/7/2022    APTT     Standing Status:   Future     Number of Occurrences:   1     Standing Expiration Date:   5/7/2022    Protime-INR     Standing Status:   Future     Number of Occurrences:   1     Standing Expiration Date:   5/7/2022    UA w Reflex to Microscopic w Reflex to Culture    ECG 12 lead     Standing Status:   Future     Standing Expiration Date:   5/7/2022         Subjective:     Patient ID: Marlena Evans is a 37 y o  female      HPI  Weight loss  Patient stated she has been losing weight without trying to lose weight in the last 3 4 months  Admit to moderate amount of weight loss  Patient denied changing her diet or exercise level  She does feel cold sometimes  And she feel tired sometimes  Ecchymosis  She noticed ecchymosis at both forearm, often on in the last 2 months  She does not remember any trauma but 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  Red spot    She noticed tiny red spot at the right upper arm and chest area in the last 2 months  Stable ,not painful  Splenic lesion  Denied abdominal pain  Enlarged lymph node of them neck persist   Denied sore throat  Denied fever or chills  Abdominal MRI  CT scan of the neck  Not done  Review of Systems   Constitutional: Positive for unexpected weight change  Negative for chills, diaphoresis and fatigue  HENT: Negative for ear pain, sore throat, trouble swallowing and voice change  Eyes: Negative for visual disturbance  Respiratory: Negative for cough, chest tightness and shortness of breath  Cardiovascular: Negative for chest pain, palpitations and leg swelling  Gastrointestinal: Negative for abdominal pain, blood in stool, constipation, diarrhea and nausea  Endocrine: Negative for polydipsia and polyuria  Genitourinary: Negative for dysuria, flank pain, frequency, hematuria, pelvic pain, urgency, vaginal bleeding and vaginal discharge  Musculoskeletal: Negative for arthralgias, back pain, gait problem, myalgias and neck pain  Skin: Negative for rash  Allergic/Immunologic: Negative for food allergies  Neurological: Negative for dizziness, tremors, seizures, weakness, light-headedness, numbness and headaches  Hematological: Negative for adenopathy  Bruises/bleeds easily  Psychiatric/Behavioral: Negative for confusion and dysphoric mood  The patient is not nervous/anxious  Objective:     Physical Exam  Nursing note reviewed  Constitutional:       General: She is not in acute distress  Appearance: Normal appearance  She is well-developed  She is not diaphoretic  HENT:      Head: Normocephalic  Eyes:      General: No scleral icterus  Extraocular Movements: Extraocular movements intact  Conjunctiva/sclera: Conjunctivae normal    Neck:      Musculoskeletal: Neck supple  Thyroid: No thyromegaly  Vascular: No carotid bruit     Cardiovascular:      Rate and Rhythm: Normal rate and regular rhythm  Heart sounds: Normal heart sounds  Pulmonary:      Effort: Pulmonary effort is normal       Breath sounds: Normal breath sounds  Abdominal:      General: Bowel sounds are normal  There is no distension  Palpations: Abdomen is soft  There is no mass  Tenderness: There is no abdominal tenderness  There is no guarding or rebound  Hernia: No hernia is present  Musculoskeletal:         General: No swelling  Right lower leg: No edema  Left lower leg: No edema  Lymphadenopathy:      Cervical: Cervical adenopathy present  Skin:     Findings: No rash  Comments: Positive small area of or ecchymosis on both forearm  About 1-1/2 inch by 1-1/2 inch on the right forearm and smaller on the left forearm  There are few tiny flat red spot on the upper right arm  Less than 1 mm each  And they are round elevated pink  lesions at the mid upper chest about 1-2 mm   Neurological:      General: No focal deficit present  Mental Status: She is alert and oriented to person, place, and time  Cranial Nerves: No cranial nerve deficit  Motor: No weakness or abnormal muscle tone  Coordination: Coordination normal       Gait: Gait normal    Psychiatric:         Mood and Affect: Mood normal          Behavior: Behavior normal          Thought Content:  Thought content normal

## 2021-05-11 ENCOUNTER — TELEPHONE (OUTPATIENT)
Dept: FAMILY MEDICINE CLINIC | Facility: CLINIC | Age: 36
End: 2021-05-11

## 2021-05-11 DIAGNOSIS — E87.8 HIGH SERUM CHLORIDE: Primary | ICD-10-CM

## 2021-05-11 NOTE — TELEPHONE ENCOUNTER
----- Message from Geo Aden MD sent at 5/10/2021  9:49 AM EDT -----  Labs are remarkable for elevated chloride a follow low chloride diet    Recheck chloride level with next office

## 2021-07-18 ENCOUNTER — HOSPITAL ENCOUNTER (EMERGENCY)
Facility: HOSPITAL | Age: 36
Discharge: HOME | End: 2021-07-18
Attending: EMERGENCY MEDICINE | Admitting: EMERGENCY MEDICINE
Payer: COMMERCIAL

## 2021-07-18 VITALS
OXYGEN SATURATION: 100 % | SYSTOLIC BLOOD PRESSURE: 136 MMHG | HEART RATE: 90 BPM | RESPIRATION RATE: 18 BRPM | DIASTOLIC BLOOD PRESSURE: 93 MMHG | TEMPERATURE: 98.1 F

## 2021-07-18 DIAGNOSIS — S01.81XA LACERATION OF EYEBROW AND FOREHEAD: Primary | ICD-10-CM

## 2021-07-18 DIAGNOSIS — S01.119A LACERATION OF EYEBROW AND FOREHEAD: Primary | ICD-10-CM

## 2021-07-18 PROCEDURE — 12013 RPR F/E/E/N/L/M 2.6-5.0 CM: CPT | Performed by: EMERGENCY MEDICINE

## 2021-07-18 PROCEDURE — 99282 EMERGENCY DEPT VISIT SF MDM: CPT | Performed by: EMERGENCY MEDICINE

## 2021-07-18 PROCEDURE — 99283 EMERGENCY DEPT VISIT LOW MDM: CPT

## 2021-07-18 RX ORDER — LIDOCAINE HYDROCHLORIDE AND EPINEPHRINE 10; 10 MG/ML; UG/ML
10 INJECTION, SOLUTION INFILTRATION; PERINEURAL ONCE
Status: COMPLETED | OUTPATIENT
Start: 2021-07-18 | End: 2021-07-18

## 2021-07-18 RX ORDER — BACITRACIN, NEOMYCIN, POLYMYXIN B 400; 3.5; 5 [USP'U]/G; MG/G; [USP'U]/G
1 OINTMENT TOPICAL ONCE
Status: COMPLETED | OUTPATIENT
Start: 2021-07-18 | End: 2021-07-18

## 2021-07-18 RX ADMIN — LIDOCAINE HYDROCHLORIDE,EPINEPHRINE BITARTRATE 10 ML: 10; .01 INJECTION, SOLUTION INFILTRATION; PERINEURAL at 22:25

## 2021-07-18 RX ADMIN — BACITRACIN, NEOMYCIN, POLYMYXIN B 1 SMALL APPLICATION: 400; 3.5; 5 OINTMENT TOPICAL at 23:27

## 2021-07-19 NOTE — DISCHARGE INSTRUCTIONS
Keep your wound clean with warm soapy water    Apply neosporin, and you can wear a dressing as needed at work  Follow up with your family doctor suture removal in 5 days

## 2021-07-19 NOTE — ED PROVIDER NOTES
History  Chief Complaint   Patient presents with   [address] [facility] Injury     pt was playing softball (catcher) and got hit in the eye with a foul ball  Justina is a 81-year-old lady who presents after suffering an injury from a softball to her left eye  Reports this happened several hours ago while at a softball game with kids  She was playing catcher and received an impact after a softball was hit with a bat and went flying backwards and hit her directly in the eye  She received significant laceration above her left eyebrow as well as significant bruising to the area and swelling  Reports associated numbness above her eye, but denies any loss of consciousness, headache, nausea vomiting  She is not on blood thinners  Pt is primarily concerned about the laceration on her forehead and not injury to the eye  No changes in her vision or pain with eye movements  No other complaints at this time  Prior to Admission Medications   Prescriptions Last Dose Informant Patient Reported? Taking?    Mefenamic Acid 250 MG CAPS  Self No No   Sig: Take 1 capsule (250 mg total) by mouth every 6 (six) hours as needed (Dysemenorrhea) for up to 5 days   Patient not taking: Reported on 5/7/2021      Facility-Administered Medications: None       Past Medical History:   Diagnosis Date    Depression     Dysmenorrhea, unspecified 8/15/2016    Elevated transaminase level 8/15/2016    Generalized abdominal pain 8/15/2016    Joint pain 8/15/2016    Ketonuria 8/15/2016    Malaise and fatigue 8/15/2016    Night sweats 8/15/2016    Non-smoker 08/15/2016    RBBB     Right bundle branch block     SOB (shortness of breath) 8/15/2016    Spleen anomaly     mass on spleen    Splenic mass 8/15/2016       Past Surgical History:   Procedure Laterality Date    ROOT CANAL         Family History   Problem Relation Age of Onset    Hypertension Mother     Hyperlipidemia Mother     Peripheral vascular disease Father     Breast cancer Maternal Grandmother     Transient ischemic attack Maternal Grandmother     Diabetes Maternal Grandfather     Hypertension Maternal Grandfather     Hyperlipidemia Maternal Grandfather     Rheum arthritis Family     Diabetes Family     Stroke Family     Fibromyalgia Family     Heart attack Family         MI     I have reviewed and agree with the history as documented  E-Cigarette/Vaping     E-Cigarette/Vaping Substances     Social History     Tobacco Use    Smoking status: Former Smoker    Smokeless tobacco: Former User    Tobacco comment: no passive smoke exposure    Substance Use Topics    Alcohol use: Not Currently     Alcohol/week: 3 0 - 5 0 standard drinks     Types: 3 - 5 Cans of beer per week     Comment:  patient stopped drinking 8 months ago    Drug use: No        Review of Systems   Constitutional: Negative for chills and fever  HENT: Negative for ear pain and sore throat  Eyes: Positive for pain  Negative for visual disturbance  Respiratory: Negative for cough and shortness of breath  Cardiovascular: Negative for chest pain and palpitations  Gastrointestinal: Negative for abdominal pain and vomiting  Genitourinary: Negative for dysuria and hematuria  Musculoskeletal: Negative for arthralgias and back pain  Skin: Positive for color change  Neurological: Positive for numbness  Negative for syncope and headaches  Hematological: Bruises/bleeds easily  All other systems reviewed and are negative  Physical Exam  ED Triage Vitals [07/18/21 1940]   Temperature Pulse Respirations Blood Pressure SpO2   98 1 °F (36 7 °C) 90 18 136/93 100 %      Temp Source Heart Rate Source Patient Position - Orthostatic VS BP Location FiO2 (%)   Oral Monitor Lying Left arm --      Pain Score       1             Orthostatic Vital Signs  Vitals:    07/18/21 1940   BP: 136/93   Pulse: 90   Patient Position - Orthostatic VS: Lying       Physical Exam  Vitals and nursing note reviewed  Constitutional:       General: She is not in acute distress  Appearance: She is well-developed  HENT:      Head: Normocephalic and atraumatic  Eyes:      Extraocular Movements:      Right eye: Normal extraocular motion  Left eye: Normal extraocular motion  Conjunctiva/sclera: Conjunctivae normal       Left eye: Left conjunctiva is not injected  No chemosis, exudate or hemorrhage  Comments: Significant ecchymoses and laceration approximately 4 centimeters above her left eye  No hyphema, or conjunctival injection, no drainage, no pain with extraocular movements  Vision intact   Cardiovascular:      Rate and Rhythm: Normal rate and regular rhythm  Heart sounds: No murmur heard  Pulmonary:      Effort: Pulmonary effort is normal  No respiratory distress  Breath sounds: Normal breath sounds  Abdominal:      Palpations: Abdomen is soft  Tenderness: There is no abdominal tenderness  Musculoskeletal:      Cervical back: Neck supple  Skin:     General: Skin is warm and dry  Neurological:      Mental Status: She is alert  ED Medications  Medications   lidocaine-epinephrine (XYLOCAINE/EPINEPHRINE) 1 %-1:100,000 injection 10 mL (10 mL Infiltration Given by Other 7/18/21 2225)   neomycin-bacitracin-polymyxin b (NEOSPORIN) ointment 1 small application (1 small application Topical Given 7/18/21 2327)       Diagnostic Studies  Results Reviewed     None                 No orders to display         Procedures  Laceration repair    Date/Time: 7/18/2021 11:40 PM  Performed by: Yadi Bello MD  Authorized by: Yadi Bello MD   Consent: Verbal consent obtained    Consent given by: patient  Patient understanding: patient states understanding of the procedure being performed  Patient consent: the patient's understanding of the procedure matches consent given  Body area: head/neck  Location details: left eyebrow  Laceration length: 5 cm  Foreign bodies: no foreign bodies  Tendon involvement: none  Nerve involvement: none  Vascular damage: no  Anesthesia: local infiltration    Anesthesia:  Local Anesthetic: lidocaine 1% with epinephrine    Sedation:  Patient sedated: no      Wound Dehiscence:  Superficial Wound Dehiscence: simple closure      Procedure Details:  Irrigation solution: saline  Amount of cleaning: standard  Debridement: none  Degree of undermining: none  Skin closure: 6-0 nylon  Number of sutures: 11  Technique: simple  Approximation: close  Approximation difficulty: simple  Dressing: 4x4 sterile gauze and antibiotic ointment  Patient tolerance: patient tolerated the procedure well with no immediate complications            ED Course                                       MDM  Number of Diagnoses or Management Perfecto Cogan is a 37year old lady suffered from acute eye injury  Vital stable, physical exam remarkable for ecchymosis and laceration above her left eye  No concerns for injury to the globe, retina, or extra-ocular muscles based on physical exam  No changes in the pts vision, pupils equal and reactive, no pain with eye movement  Treatment will consist of laceration repair with primary closure, as described above  Advised pt to keep the wound clean with warm soapy, neosporin dressing, and to follow up with PCP for suture removal in 5 days  Advised patient to return immediately to the emergency department if she has headache, loss consciousness, acute vision changes  Also advised patient that she will have a significant hematoma around her eye the next several days, and that should heal in the following weeks  Discussed the above care and treatment plan with the patient  The patient expressed understanding, was agreeable to the plan of care, and had no further questions or concerns  Discharged home      Disposition  Final diagnoses:   Laceration of eyebrow and forehead     Time reflects when diagnosis was documented in both MDM as applicable and the Disposition within this note     Time User Action Codes Description Comment    7/18/2021 11:14 PM Amaya Guardado Add [B87 [id number],  [id number]] Laceration of eyebrow and forehead       ED Disposition     ED Disposition Condition Date/Time Comment    Discharge Stable Sun Jul 18, 2021 11:14 PM Jaky Hobbs discharge to home/self care  Follow-up Information     Follow up With Specialties Details Why Contact Info Additional Information    Trey Jones MD Family Medicine  As needed, For suture removal [address] [facility]  [address] Emergency Department Emergency Medicine  If symptoms worsen [address]  [address] [facility] [address] [facility] Emergency Department, [address], [place], [facility]          Discharge Medication List as of 7/18/2021 11:16 PM      CONTINUE these medications which have NOT CHANGED    Details   Mefenamic Acid 250 MG CAPS Take 1 capsule (250 mg total) by mouth every 6 (six) hours as needed (Dysemenorrhea) for up to 5 days, Starting Tue 4/9/2019, Until Fri 5/7/2021, Normal           No discharge procedures on file  PDMP Review     None           ED Provider  Attending physically available and evaluated Jaky Hbobs  MARITA managed the patient along with the ED Attending      Electronically Signed by         Parmjit Guerrero MD  07/19/21 0002

## 2021-07-19 NOTE — ED ATTENDING ATTESTATION
7/18/2021  ILiberty MD, saw and evaluated the patient  I have discussed the patient with the resident/non-physician practitioner and agree with the resident's/non-physician practitioner's findings, Plan of Care, and MDM as documented in the resident's/non-physician practitioner's note, except where noted  All available labs and Radiology studies were reviewed  I was present for key portions of any procedure(s) performed by the resident/non-physician practitioner and I was immediately available to provide assistance  At this point I agree with the current assessment done in the Emergency Department    I have conducted an independent evaluation of this patient a history and physical is as follows:  Patient was struck with a softball she was catching  She did not have a mass gone foul tip it hurt in the left supraorbital area she suffered a laceration just above her eyebrow on the left no loss of consciousness no eye pain no visual changes  No headache no anticoagulation  No vomiting  Exam the patient has a 5 cm laceration above the left supraorbital area bone is intact there is some mild ecchymosis on the upper lid her eye pupil equal reactive to light extraocular muscles intact no hyphema noted  Patient is able to wrinkle her forehead without difficulty he did complain of some minimal numbness over her right eyebrow however no real objective findings  Neurologic exam awake alert moves all extremities purposely    Impression laceration supraorbital left  ED Course         Critical Care Time  Procedures

## 2021-07-19 NOTE — ED PROCEDURE NOTE
PROCEDURE  Laceration repair    Date/Time: 7/18/2021 11:06 PM  Performed by: Meseret Mixon MD  Authorized by: Meseret Mixon MD   Consent: Verbal consent obtained    Body area: head/neck  Location details: left eyelid  Laceration length: 5 cm  Anesthesia: local infiltration    Anesthesia:  Local Anesthetic: lidocaine 1% with epinephrine  Anesthetic total: 4 mL    Sedation:  Patient sedated: no      Wound Dehiscence:  Superficial Wound Dehiscence: simple closure      Procedure Details:  Irrigation solution: saline (sterile saline )  Amount of cleaning: standard  Skin closure: 6-0 nylon  Number of sutures: 11           Meseret Mixon MD  07/18/21 9517

## 2022-11-20 ENCOUNTER — OFFICE VISIT (OUTPATIENT)
Dept: URGENT CARE | Facility: CLINIC | Age: 37
End: 2022-11-20

## 2022-11-20 VITALS
TEMPERATURE: 99.2 F | BODY MASS INDEX: 48.82 KG/M2 | WEIGHT: 293 LBS | HEART RATE: 88 BPM | OXYGEN SATURATION: 97 % | HEIGHT: 65 IN

## 2022-11-20 DIAGNOSIS — R06.2 WHEEZING: ICD-10-CM

## 2022-11-20 DIAGNOSIS — R68.89 FLU-LIKE SYMPTOMS: Primary | ICD-10-CM

## 2022-11-20 RX ORDER — ALBUTEROL SULFATE 90 UG/1
2 AEROSOL, METERED RESPIRATORY (INHALATION) EVERY 6 HOURS PRN
Qty: 18 G | Refills: 0 | Status: SHIPPED | OUTPATIENT
Start: 2022-11-20

## 2022-11-20 RX ORDER — GUAIFENESIN AND CODEINE PHOSPHATE 100; 10 MG/5ML; MG/5ML
SOLUTION ORAL
Qty: 180 ML | Refills: 0 | Status: SHIPPED | OUTPATIENT
Start: 2022-11-20

## 2022-11-20 NOTE — LETTER
November 20, 2022     Patient: Tonja Sofia   YOB: 1985   Date of Visit: 11/20/2022       To Whom it May Concern:    Tonja Sofia was seen in my clinic on 11/20/2022  Please excuse from work tomorrow due to illness  If you have any questions or concerns, please don't hesitate to call           Sincerely,          BE ERIN CHAPMAN        CC: No Recipients

## 2022-11-20 NOTE — PATIENT INSTRUCTIONS
--Rest, drink plenty of fluids   --For nasal/sinus congestion, helpful measures include steam, warm compresses, an OTC saline nasal spray or Neti pot, or an OTC decongestant (such as Sudafed)  The decongestant should be avoided, however, if you are under 10years of age, or have a history of high blood pressure or heart disease  In addition, an OTC nasal steroid (Flonase, Nasocort) or nasal decongestant (Afrin, Norm-synephrine) may be taken  The nasal steroid should be used at bedtime, after the saline nasal spray  The nasal decongestant should not be taken more than 3 days consecutively in order to prevent rebound congestion  --For nasal drainage, postnasal drip, sneezing and itching, an OTC antihistamine (Allegra, Benadryl, etc) can be taken  --For cough, you can take an OTC expectorant such as plain Robitussion or Mucinex (active ingredient guaifenesin)  A spoonful of honey at bedtime may also be helpful, as may a prescription cough medicine  Also recommended is the use of a cool mist humidifier (with or without Vicks) in the bedroom at night  --For sore throat, you can take OTC lozenges, use warm gargles (salt water or apple cider vinegar and honey), herbal teas, or an OTC throat spray (Chloraseptic)  --You can take Tylenol or Motrin/Advil as needed for fever, headache, body aches  Motrin/Advil should be avoided, however, if you have a history of heart disease, bleeding ulcers, or if you take blood thinners  --You should contact your primary care provider and/or go to the ER if your symptoms are not improved or get worse over the next 3-5 days  This includes new onset fever, localized ear pain, sinus pain, as well as worsening cough, chest pain, shortness of breath, or significant weakness/fatigue         --Go to ER for any recurrent/persistent blood in sputum

## 2022-11-20 NOTE — PROGRESS NOTES
[facility]        NAME: Shirley Han is a 30 y o  female  : 1985    MRN: [id number]  DATE: 2022  TIME: 4:21 PM    Assessment and Plan   Flu-like symptoms [R68 89]  1  Flu-like symptoms  guaifenesin-codeine (GUAIFENESIN AC) 100-10 MG/5ML liquid      2  Wheezing  albuterol (Ventolin HFA) 90 mcg/act inhaler        --Suspected influenza  Address per below  Past window of effectiveness for Tamiflu  OTC Sambucol (black elderberry) recommended  Patient Instructions     --Rest, drink plenty of fluids   --For nasal/sinus congestion, helpful measures include steam, warm compresses, an OTC saline nasal spray or Neti pot, or an OTC decongestant (such as Sudafed)  The decongestant should be avoided, however, if you are under 10years of age, or have a history of high blood pressure or heart disease  In addition, an OTC nasal steroid (Flonase, Nasocort) or nasal decongestant (Afrin, Norm-synephrine) may be taken  The nasal steroid should be used at bedtime, after the saline nasal spray  The nasal decongestant should not be taken more than 3 days consecutively in order to prevent rebound congestion  --For nasal drainage, postnasal drip, sneezing and itching, an OTC antihistamine (Allegra, Benadryl, etc) can be taken  --For cough, you can take an OTC expectorant such as plain Robitussion or Mucinex (active ingredient guaifenesin)  A spoonful of honey at bedtime may also be helpful, as may a prescription cough medicine  Also recommended is the use of a cool mist humidifier (with or without Vicks) in the bedroom at night  --For sore throat, you can take OTC lozenges, use warm gargles (salt water or apple cider vinegar and honey), herbal teas, or an OTC throat spray (Chloraseptic)  --You can take Tylenol or Motrin/Advil as needed for fever, headache, body aches  Motrin/Advil should be avoided, however, if you have a history of heart disease, bleeding ulcers, or if you take blood thinners  --You should contact your primary care provider and/or go to the ER if your symptoms are not improved or get worse over the next 3-5 days  This includes new onset fever, localized ear pain, sinus pain, as well as worsening cough, chest pain, shortness of breath, or significant weakness/fatigue  --Go to ER for any recurrent/persistent blood in sputum    Chief Complaint     Chief Complaint   Patient presents with   • Cough     Cough, fever, congestion, Trouble breathing when laying down, coughing up bloody phlegm- started 11/18  OTC did not help         History of Present Illness       Here with complaints of headache, body aches, fatigue, fever/chills, cough, nasal congestion, rhinorrhea x 3 days  Cough productive of brown sputum with occasional "small amounts" of blood  Some chest tightness, wheezing at times  No CP, dyspnea  No ear pain  No abdominal pain, N/V/D  Taking Tylenol and Mucinex  Negative home COVID test        Review of Systems   Review of Systems   Constitutional: Positive for fatigue and fever  HENT: Positive for rhinorrhea and sore throat  Negative for ear pain  Respiratory: Positive for cough and wheezing  Cardiovascular: Negative for chest pain  Gastrointestinal: Negative for abdominal pain, nausea and vomiting  Musculoskeletal: Positive for myalgias  Neurological: Positive for headaches           Current Medications       Current Outpatient Medications:   •  albuterol (Ventolin HFA) 90 mcg/act inhaler, Inhale 2 puffs every 6 (six) hours as needed for wheezing, Disp: 18 g, Rfl: 0  •  guaifenesin-codeine (GUAIFENESIN AC) 100-10 MG/5ML liquid, TAKE 1-2 TSP BY  MOUTH EVERY 4-6 HOURS AS NEEDED FOR COUGH, Disp: 180 mL, Rfl: 0  •  Mefenamic Acid 250 MG CAPS, Take 1 capsule (250 mg total) by mouth every 6 (six) hours as needed (Dysemenorrhea) for up to 5 days (Patient not taking: Reported on 5/7/2021), Disp: 28 each, Rfl: 6    Current Allergies     Allergies as of 11/20/2022 - Reviewed 11/20/2022   Allergen Reaction Noted   • Amoxicillin Rash 12/21/2017   • Augmentin [amoxicillin-pot clavulanate] Rash 08/15/2016            The following portions of the patient's history were reviewed and updated as appropriate: allergies, current medications, past family history, past medical history, past social history, past surgical history and problem list      Past Medical History:   Diagnosis Date   • Depression    • Dysmenorrhea, unspecified 8/15/2016   • Elevated transaminase level 8/15/2016   • Generalized abdominal pain 8/15/2016   • Joint pain 8/15/2016   • Ketonuria 8/15/2016   • Malaise and fatigue 8/15/2016   • Night sweats 8/15/2016   • Non-smoker 08/15/2016   • RBBB    • Right bundle branch block    • SOB (shortness of breath) 8/15/2016   • Spleen anomaly     mass on spleen   • Splenic mass 8/15/2016       Past Surgical History:   Procedure Laterality Date   • ROOT CANAL         Family History   Problem Relation Age of Onset   • Hypertension Mother    • Hyperlipidemia Mother    • Peripheral vascular disease Father    • Breast cancer Maternal Grandmother    • Transient ischemic attack Maternal Grandmother    • Diabetes Maternal Grandfather    • Hypertension Maternal Grandfather    • Hyperlipidemia Maternal Grandfather    • Rheum arthritis Family    • Diabetes Family    • Stroke Family    • Fibromyalgia Family    • Heart attack Family         MI         Medications have been verified  Objective   Pulse 88   Temp 99 2 °F (37 3 °C)   Ht 5' 5" (1 651 m)   Wt (!) 141 kg (310 lb)   SpO2 97%   BMI 51 59 kg/m²   No LMP recorded  Physical Exam     Physical Exam  Constitutional:       General: She is not in acute distress  Appearance: Normal appearance  She is well-developed  She is not ill-appearing, toxic-appearing or diaphoretic  HENT:      Head: Normocephalic        Right Ear: Tympanic membrane, ear canal and external ear normal       Left Ear: Tympanic membrane, ear canal and external ear normal       Nose: Congestion and rhinorrhea present  Mouth/Throat:      Pharynx: No oropharyngeal exudate or posterior oropharyngeal erythema  Cardiovascular:      Rate and Rhythm: Normal rate and regular rhythm  Heart sounds: Normal heart sounds  No murmur heard  Pulmonary:      Effort: Pulmonary effort is normal  No respiratory distress  Breath sounds: Normal breath sounds  No stridor  No wheezing, rhonchi or rales  Chest:      Chest wall: No tenderness  Abdominal:      Tenderness: There is no abdominal tenderness  Musculoskeletal:      Cervical back: Neck supple  Lymphadenopathy:      Cervical: No cervical adenopathy  Skin:     General: Skin is warm and dry  Neurological:      Mental Status: She is alert and oriented to person, place, and time  Deep Tendon Reflexes: Reflexes are normal and symmetric     Psychiatric:         Mood and Affect: Mood normal

## 2023-02-27 ENCOUNTER — OFFICE VISIT (OUTPATIENT)
Dept: FAMILY MEDICINE CLINIC | Facility: CLINIC | Age: 38
End: 2023-02-27

## 2023-02-27 VITALS
BODY MASS INDEX: 48.82 KG/M2 | WEIGHT: 293 LBS | HEIGHT: 65 IN | SYSTOLIC BLOOD PRESSURE: 138 MMHG | DIASTOLIC BLOOD PRESSURE: 80 MMHG | HEART RATE: 77 BPM | TEMPERATURE: 97.7 F | OXYGEN SATURATION: 97 %

## 2023-02-27 DIAGNOSIS — R16.1 SPLENIC MASS: ICD-10-CM

## 2023-02-27 DIAGNOSIS — E66.01 CLASS 3 SEVERE OBESITY DUE TO EXCESS CALORIES WITHOUT SERIOUS COMORBIDITY WITH BODY MASS INDEX (BMI) OF 50.0 TO 59.9 IN ADULT (HCC): ICD-10-CM

## 2023-02-27 DIAGNOSIS — R53.83 OTHER FATIGUE: ICD-10-CM

## 2023-02-27 DIAGNOSIS — Z00.00 ANNUAL PHYSICAL EXAM: Primary | ICD-10-CM

## 2023-02-27 NOTE — ASSESSMENT & PLAN NOTE
- Satisfactory physical examination  - Patient up to date with cervical cancer screening with last pap smear in 2019

## 2023-02-27 NOTE — ASSESSMENT & PLAN NOTE
BMI Counseling: Body mass index is 53 32 kg/m²  The BMI is above normal  Nutrition recommendations include decreasing overall calorie intake, 3-5 servings of fruits/vegetables daily, reducing fast food intake, consuming healthier snacks, moderation in carbohydrate intake, increasing intake of lean protein and reducing intake of saturated fat and trans fat  Exercise recommendations include moderate aerobic physical activity for 150 minutes/week  - The USPSTF recommends screening for prediabetes and type 2 diabetes in adults aged 28 to 79 years who are overweight or obese   Hemoglobin A1C and lipid panel ordered

## 2023-02-27 NOTE — PROGRESS NOTES
[facility]    NAME: Mahamed Danielson  AGE: 30 y o  SEX: female  : 1985     DATE: 2023     Assessment and Plan:     Problem List Items Addressed This Visit        Other    Splenic mass     - CT abdomen and pelvis 2018 showed: minimal interval enlargement of the complex cystic lesion in the spleen and persistent mild splenomegaly   - Advised patient to get repeat CT abdomen and pelvis previously ordered          Obesity     BMI Counseling: Body mass index is 53 32 kg/m²  The BMI is above normal  Nutrition recommendations include decreasing overall calorie intake, 3-5 servings of fruits/vegetables daily, reducing fast food intake, consuming healthier snacks, moderation in carbohydrate intake, increasing intake of lean protein and reducing intake of saturated fat and trans fat  Exercise recommendations include moderate aerobic physical activity for 150 minutes/week  - The USPSTF recommends screening for prediabetes and type 2 diabetes in adults aged 28 to 79 years who are overweight or obese  Hemoglobin A1C and lipid panel ordered          Relevant Orders    HEMOGLOBIN A1C W/ EAG ESTIMATION    Lipid Panel with Direct LDL reflex    Annual physical exam - Primary     - Satisfactory physical examination  - Patient up to date with cervical cancer screening with last pap smear in 2019         Other fatigue     - Depression screening negative  This could be due to untreated sleep apnea given the snoring  We discussed obtaining a sleep study however patient declined at this time opting to try and lose weight first  Will also order CBC to rule out anemia; CMP, phosphorus and UA to rule out kidney, liver and electrolyte imbalanced, TSH to rule out thyroid disorder and CRP to rule out inflammatory condition           Relevant Orders    CBC and differential    Comprehensive metabolic panel    TSH, 3rd generation with Free T4 reflex    UA w Reflex to Microscopic w Reflex to Culture -Lab Collect    Phosphorus    C-reactive protein       Immunizations and preventive care screenings were discussed with patient today  Appropriate education was printed on patient's after visit summary  Counseling:  Dental Health: discussed importance of regular tooth brushing, flossing, and dental visits  · Exercise: the importance of regular exercise/physical activity was discussed  Recommend exercise 3-5 times per week for at least 30 minutes  Return in about 6 months (around 8/27/2023) for f/u weight   Chief Complaint:     Chief Complaint   Patient presents with   • Physical Exam      History of Present Illness:     Adult Annual Physical   Celia Lozano is a pleasant 30year old female who presents today a comprehensive physical exam  Overall patient feels well although has been sick with recurrent sinus infections  She also had a bout with influenza and covid this past winter  She does have residual nasal congestion as well as sinus pain/pressure  She was getting nosebleeds but states that those seem to have improved  She is not taking any medication for her symptoms  She has also been feeling fatigued and according to her wife she has been snoring rather loudly  Diet and Physical Activity  · Diet/Nutrition: well balanced diet  · Exercise: no formal exercise  Depression Screening  PHQ-2/9 Depression Screening    Little interest or pleasure in doing things: 2 - more than half the days  Feeling down, depressed, or hopeless: 0 - not at all  PHQ-2 Score: 2  PHQ-2 Interpretation: Negative depression screen       General Health  · Sleep: snores loudly  · Hearing: No hearing issues  · Vision: most recent eye exam >1 year ago and wears glasses  · Dental: regular dental visits  /GYN Health  · Last menstrual period: Patient's last menstrual period was 02/13/2023  · Contraceptive method: None       Review of Systems:     Review of Systems Constitutional: Negative  HENT: Positive for congestion, nosebleeds, sinus pressure and sinus pain  Eyes: Negative  Respiratory:        Snoring   Cardiovascular: Negative  Gastrointestinal: Negative  Genitourinary: Negative  Musculoskeletal: Negative  Skin: Negative  Neurological: Negative  Psychiatric/Behavioral: Negative         Past Medical History:     Past Medical History:   Diagnosis Date   • Depression    • Dysmenorrhea, unspecified 8/15/2016   • Elevated transaminase level 8/15/2016   • Generalized abdominal pain 8/15/2016   • Joint pain 8/15/2016   • Ketonuria 8/15/2016   • Malaise and fatigue 8/15/2016   • Night sweats 8/15/2016   • Non-smoker 08/15/2016   • RBBB    • Right bundle branch block    • SOB (shortness of breath) 8/15/2016   • Spleen anomaly     mass on spleen   • Splenic mass 8/15/2016      Past Surgical History:     Past Surgical History:   Procedure Laterality Date   • ROOT CANAL        Social History:     Social History     Socioeconomic History   • Marital status: /Civil Union     Spouse name: None   • Number of children: None   • Years of education: None   • Highest education level: None   Occupational History   • Occupation: [employer] Worker   Tobacco Use   • Smoking status: Former   • Smokeless tobacco: Former   • Tobacco comments:     no passive smoke exposure    Vaping Use   • Vaping Use: Never used   Substance and Sexual Activity   • Alcohol use: Not Currently     Alcohol/week: 3 0 - 5 0 standard drinks     Types: 3 - 5 Cans of beer per week     Comment:  patient stopped drinking 8 months ago   • Drug use: No   • Sexual activity: Yes     Partners: Female   Other Topics Concern   • None   Social History Narrative   • None     Social Determinants of Health     Financial Resource Strain: Not on file   Food Insecurity: Not on file   Transportation Needs: Not on file   Physical Activity: Not on file   Stress: Not on file   Social Connections: Not on file Intimate Partner Violence: Not on file   Housing Stability: Not on file      Family History:     Family History   Problem Relation Age of Onset   • Hypertension Mother    • Hyperlipidemia Mother    • Peripheral vascular disease Father    • Breast cancer Maternal Grandmother    • Transient ischemic attack Maternal Grandmother    • Diabetes Maternal Grandfather    • Hypertension Maternal Grandfather    • Hyperlipidemia Maternal Grandfather    • Rheum arthritis Family    • Diabetes Family    • Stroke Family    • Fibromyalgia Family    • Heart attack Family         MI      Current Medications:     No current outpatient medications on file  No current facility-administered medications for this visit  Allergies: Allergies   Allergen Reactions   • Amoxicillin Rash   • Augmentin [Amoxicillin-Pot Clavulanate] Rash      Physical Exam:     /80 (BP Location: Left arm, Patient Position: Sitting, Cuff Size: Large)   Pulse 77   Temp 97 7 °F (36 5 °C) (Temporal)   Ht 5' 5" (1 651 m)   Wt (!) 145 kg (320 lb 6 4 oz)   LMP 02/13/2023   SpO2 97%   BMI 53 32 kg/m²     Physical Exam  Constitutional:       General: She is not in acute distress  Appearance: She is not ill-appearing  HENT:      Head: Normocephalic and atraumatic  Mouth/Throat:      Mouth: Mucous membranes are moist       Pharynx: No oropharyngeal exudate or posterior oropharyngeal erythema  Eyes:      General:         Right eye: No discharge  Left eye: No discharge  Extraocular Movements: Extraocular movements intact  Pupils: Pupils are equal, round, and reactive to light  Cardiovascular:      Rate and Rhythm: Normal rate  Pulses: Normal pulses  Pulmonary:      Effort: Pulmonary effort is normal  No respiratory distress  Breath sounds: No wheezing  Abdominal:      General: Bowel sounds are normal  There is no distension  Palpations: Abdomen is soft  Tenderness:  There is no abdominal tenderness  There is no guarding  Musculoskeletal:      Cervical back: Normal range of motion  Right lower leg: No edema  Left lower leg: No edema  Neurological:      General: No focal deficit present  Mental Status: She is alert  Motor: No weakness        Coordination: Coordination normal       Gait: Gait normal    Psychiatric:         Mood and Affect: Mood normal          Behavior: Behavior normal           Madhav Pillai MD   [address] [facility]

## 2023-02-27 NOTE — ASSESSMENT & PLAN NOTE
- CT abdomen and pelvis 2018 showed: minimal interval enlargement of the complex cystic lesion in the spleen and persistent mild splenomegaly   - Advised patient to get repeat CT abdomen and pelvis previously ordered

## 2023-02-27 NOTE — ASSESSMENT & PLAN NOTE
- Depression screening negative  This could be due to untreated sleep apnea given the snoring  We discussed obtaining a sleep study however patient declined at this time opting to try and lose weight first  Will also order CBC to rule out anemia; CMP, phosphorus and UA to rule out kidney, liver and electrolyte imbalanced, TSH to rule out thyroid disorder and CRP to rule out inflammatory condition

## 2023-03-23 ENCOUNTER — HOSPITAL ENCOUNTER (OUTPATIENT)
Dept: CT IMAGING | Facility: HOSPITAL | Age: 38
End: 2023-03-23

## 2023-03-23 DIAGNOSIS — D73.89 LESION OF SPLEEN: ICD-10-CM

## 2023-03-23 RX ADMIN — IOHEXOL 200 ML: 350 INJECTION, SOLUTION INTRAVENOUS at 16:50

## 2023-03-23 NOTE — DISCHARGE INSTRUCTIONS
Extravasation   WHAT YOU NEED TO KNOW:   What is extravasation? Extravasation is when fluid leaks out of your vein and into the soft tissue around an IV  The fluid is a vesicant medicine  This means that it can cause tissue damage, blisters, or severe tissue loss  Some examples of vesicant medicines include chemo medicines, contrast liquid, certain antibiotics, and seizure medicine  What are the signs and symptoms of extravasation? Pain around the IV site    Inflammation and tightness of your skin    Trouble flushing the IV catheter    Pale, cool skin    Blisters    What causes extravasation? The IV catheter may push through the side of your vein  The IV catheter may move from where it is inserted  Fluid may leak through the area where the catheter enters the vein  The vein may be fragile and may tear with the IV fluid  Fluid may leak through the side of your vein  A blockage may cause the medicine or fluid to build up  What increases my risk for extravasation? Fragile veins or skin    Not being able to see the IV site    IV treatments over a long period of time    IV treatments that are given quickly or that give a large amount of fluid    Type of medicine being given, such as a chemo medicine    How is extravasation treated? Your healthcare provider may do any or all of the following:  Stop the IV infusion    Remove the IV    Outline the area with a marker    Take pictures of the area    Raise and rest your limb on pillows    Apply a hot or cold compress on the area    Inject medicine into the tissue    How can I manage my symptoms? Continue to apply ice on the area  as directed or for 15 to 20 minutes every hour  Use an ice pack, or put crushed ice in a plastic bag  Cover it with a towel  Ice helps prevent tissue damage and decreases swelling and pain  Continue to apply heat on the area  as directed or for 20 to 30 minutes every 2 hours   Heat helps decrease pain and muscle spasms  Continue to elevate the area  above the level of your heart as often as you can  This will help decrease swelling and pain  Prop your limb on pillows or blankets to keep it elevated comfortably  Ask how to clean the area  Your healthcare provider may bandage the area  He or she may tell you which products you can apply on the area, such as a mild soap  Medicine  may help relieve pain or inflammation:    Acetaminophen  decreases pain and fever  It is available without a doctor's order  Ask how much to take and how often to take it  Follow directions  Read the labels of all other medicines you are using to see if they also contain acetaminophen, or ask your doctor or pharmacist  Acetaminophen can cause liver damage if not taken correctly  Do not use more than 4 grams (4,000 milligrams) total of acetaminophen in one day  NSAIDs , such as ibuprofen, help decrease swelling, pain, and fever  This medicine is available with or without a doctor's order  NSAIDs can cause stomach bleeding or kidney problems in certain people  If you take blood thinner medicine, always ask your healthcare provider if NSAIDs are safe for you  Always read the medicine label and follow directions  When should I seek immediate care? You have severe pain  When should I call my doctor? You have a fever  Your pain does not get better, or gets worse  The area becomes more red and swollen  You see red streaks coming from the area  You have questions or concerns about your condition or care  CARE AGREEMENT:   You have the right to help plan your care  Learn about your health condition and how it may be treated  Discuss treatment options with your healthcare providers to decide what care you want to receive  You always have the right to refuse treatment  The above information is an  only  It is not intended as medical advice for individual conditions or treatments   Talk to your doctor, nurse or pharmacist before following any medical regimen to see if it is safe and effective for you  © Copyright [facility] 2022 Information is for End User's use only and may not be sold, redistributed or otherwise used for commercial purposes   All illustrations and images included in CareNotes® are the copyrighted property of A D A M , Inc  or [address] [place] [address]

## 2023-03-23 NOTE — LETTER
[address] [place] [address]  [address] [facility] [address]      April 7, 2023    MRN: [id number]     Phone: [phone]     Dear Ms  Coby Florez recently had a(n) Cat Scan performed on 3/23/2023 at  [address] [place] [address] that was requested by Kieran Chen MD  The study was reviewed by a radiologist, which is a physician who specializes in medical imaging  The radiologist issued a report describing his or her findings  In that report there was a finding that the radiologist felt warranted further discussion with your health care provider and that discussion would be beneficial to you  The results were sent to Kieran Chen MD on 04/02/2023 12:14 PM  We recommend that you contact Kieran Chen MD at [phone] or set up an appointment to discuss the results of the imaging test  If you have already heard from Kieran Chen MD regarding the results of your study, you can disregard this letter  This letter is not meant to alarm you, but intended to encourage you to follow-up on your results with the provider that sent you for the imaging study  In addition, we have enclosed answers to frequently asked questions by other patients who have also received a letter to review results with their health care provider (see page two)  Thank you for choosing [address] [place] [address] for your medical imaging needs  FREQUENTLY ASKED QUESTIONS    1  Why am I receiving this letter? [address] [place] [address] requires us to notify patients who have findings on imaging exams that may require more testing or follow-up with a health professional within the next 3 months  2  How serious is the finding on the imaging test?  This letter is sent to all patients who may need follow-up or more testing within the next 3 months    Receiving this letter does not necessarily mean you have a life-threatening imaging finding or disease  Recommendations in the radiologist’s imaging report are general in nature and it is up to your healthcare provider to say whether those recommendations make sense for your situation  You are strongly encouraged to talk to your health care provider about the results and ask whether additional steps need to be taken  3  Where can I get a copy of the final report for my recent radiology exam?  To get a full copy of the report you can access your records online at [email or web address] or please contact [facility] at [phone] Monday through Friday between 8 am and 6 pm          4  What do I need to do now? Please contact your health care provider who requested the imaging study to discuss what further actions (if any) are needed  You may have already heard from (your ordering provider) in regard to this test in which case you can disregard this letter  NOTICE IN ACCORDANCE WITH THE [place] STATE “PATIENT TEST RESULT INFORMATION ACT OF 2018”    You are receiving this notice as a result of a determination by your diagnostic imaging service that further discussions of your test results are warranted and would be beneficial to you  The complete results of your test or tests have been or will be sent to the health care practitioner that ordered the test or tests  It is recommended that you contact your health care practitioner to discuss your results as soon as possible

## 2023-04-03 ENCOUNTER — TELEPHONE (OUTPATIENT)
Dept: FAMILY MEDICINE CLINIC | Facility: CLINIC | Age: 38
End: 2023-04-03

## 2023-04-03 DIAGNOSIS — R16.1 SPLENIC MASS: Primary | ICD-10-CM

## 2023-04-03 NOTE — TELEPHONE ENCOUNTER
I reviewed the CT findings  There is unchanged mild splenomegaly, with continued slight interval enlargement of the 7 9 cm complex splenic lesion which could either be a hemangioma or lymphangioma (both are benign lesions)  She has also mild liver enlargement and fatty liver  Radiology is recommending an MRI of the abdomen  Please ask patient to contact central scheduling to have this done   Thank you

## 2023-05-02 ENCOUNTER — HOSPITAL ENCOUNTER (OUTPATIENT)
Dept: MRI IMAGING | Facility: HOSPITAL | Age: 38
Discharge: HOME | End: 2023-05-02

## 2023-05-02 DIAGNOSIS — R16.1 SPLENIC MASS: ICD-10-CM

## 2023-05-02 RX ADMIN — GADOBUTROL 14 ML: 604.72 INJECTION INTRAVENOUS at 16:28

## 2023-12-26 ENCOUNTER — HOSPITAL ENCOUNTER (OUTPATIENT)
Facility: HOSPITAL | Age: 38
Setting detail: OBSERVATION
Discharge: HOME | End: 2023-12-28
Attending: EMERGENCY MEDICINE | Admitting: INTERNAL MEDICINE
Payer: COMMERCIAL

## 2023-12-26 DIAGNOSIS — R42 VERTIGO: ICD-10-CM

## 2023-12-26 DIAGNOSIS — J01.90 SUBACUTE SINUSITIS, UNSPECIFIED LOCATION: ICD-10-CM

## 2023-12-26 DIAGNOSIS — R42 DIZZINESS: Primary | ICD-10-CM

## 2023-12-26 LAB
ALBUMIN SERPL BCP-MCNC: 4.3 G/DL (ref 3.5–5)
ALP SERPL-CCNC: 60 U/L (ref 34–104)
ALT SERPL W P-5'-P-CCNC: 19 U/L (ref 7–52)
ANION GAP SERPL CALCULATED.3IONS-SCNC: 7 MMOL/L
AST SERPL W P-5'-P-CCNC: 20 U/L (ref 13–39)
BASOPHILS # BLD AUTO: 0.03 THOUSANDS/ÂΜL (ref 0–0.1)
BASOPHILS NFR BLD AUTO: 0 % (ref 0–1)
BILIRUB SERPL-MCNC: 0.42 MG/DL (ref 0.2–1)
BUN SERPL-MCNC: 11 MG/DL (ref 5–25)
CALCIUM SERPL-MCNC: 8.9 MG/DL (ref 8.4–10.2)
CHLORIDE SERPL-SCNC: 106 MMOL/L (ref 96–108)
CO2 SERPL-SCNC: 24 MMOL/L (ref 21–32)
CREAT SERPL-MCNC: 0.6 MG/DL (ref 0.6–1.3)
EOSINOPHIL # BLD AUTO: 0.02 THOUSAND/ÂΜL (ref 0–0.61)
EOSINOPHIL NFR BLD AUTO: 0 % (ref 0–6)
ERYTHROCYTE [DISTWIDTH] IN BLOOD BY AUTOMATED COUNT: 15.9 % (ref 11.6–15.1)
FLUAV RNA RESP QL NAA+PROBE: NEGATIVE
FLUBV RNA RESP QL NAA+PROBE: NEGATIVE
GFR SERPL CREATININE-BSD FRML MDRD: 117 ML/MIN/1.73SQ M
GLUCOSE SERPL-MCNC: 153 MG/DL (ref 65–140)
HCT VFR BLD AUTO: 38.6 % (ref 34.8–46.1)
HGB BLD-MCNC: 12.2 G/DL (ref 11.5–15.4)
IMM GRANULOCYTES # BLD AUTO: 0.03 THOUSAND/UL (ref 0–0.2)
IMM GRANULOCYTES NFR BLD AUTO: 0 % (ref 0–2)
LYMPHOCYTES # BLD AUTO: 1.11 THOUSANDS/ÂΜL (ref 0.6–4.47)
LYMPHOCYTES NFR BLD AUTO: 12 % (ref 14–44)
MCH RBC QN AUTO: 25.3 PG (ref 26.8–34.3)
MCHC RBC AUTO-ENTMCNC: 31.6 G/DL (ref 31.4–37.4)
MCV RBC AUTO: 80 FL (ref 82–98)
MONOCYTES # BLD AUTO: 0.32 THOUSAND/ÂΜL (ref 0.17–1.22)
MONOCYTES NFR BLD AUTO: 3 % (ref 4–12)
NEUTROPHILS # BLD AUTO: 7.82 THOUSANDS/ÂΜL (ref 1.85–7.62)
NEUTS SEG NFR BLD AUTO: 85 % (ref 43–75)
NRBC BLD AUTO-RTO: 0 /100 WBCS
PLATELET # BLD AUTO: 204 THOUSANDS/UL (ref 149–390)
PMV BLD AUTO: 11 FL (ref 8.9–12.7)
POTASSIUM SERPL-SCNC: 4.4 MMOL/L (ref 3.5–5.3)
PROT SERPL-MCNC: 7.1 G/DL (ref 6.4–8.4)
RBC # BLD AUTO: 4.82 MILLION/UL (ref 3.81–5.12)
RSV RNA RESP QL NAA+PROBE: NEGATIVE
SARS-COV-2 RNA RESP QL NAA+PROBE: NEGATIVE
SODIUM SERPL-SCNC: 137 MMOL/L (ref 135–147)
WBC # BLD AUTO: 9.33 THOUSAND/UL (ref 4.31–10.16)

## 2023-12-26 PROCEDURE — 0241U HB NFCT DS VIR RESP RNA 4 TRGT: CPT

## 2023-12-26 PROCEDURE — 96374 THER/PROPH/DIAG INJ IV PUSH: CPT

## 2023-12-26 PROCEDURE — 83036 HEMOGLOBIN GLYCOSYLATED A1C: CPT | Performed by: INTERNAL MEDICINE

## 2023-12-26 PROCEDURE — 36415 COLL VENOUS BLD VENIPUNCTURE: CPT | Performed by: EMERGENCY MEDICINE

## 2023-12-26 PROCEDURE — 99285 EMERGENCY DEPT VISIT HI MDM: CPT | Performed by: EMERGENCY MEDICINE

## 2023-12-26 PROCEDURE — 93005 ELECTROCARDIOGRAM TRACING: CPT

## 2023-12-26 PROCEDURE — 85025 COMPLETE CBC W/AUTO DIFF WBC: CPT | Performed by: EMERGENCY MEDICINE

## 2023-12-26 PROCEDURE — 80053 COMPREHEN METABOLIC PANEL: CPT | Performed by: EMERGENCY MEDICINE

## 2023-12-26 PROCEDURE — 96361 HYDRATE IV INFUSION ADD-ON: CPT

## 2023-12-26 PROCEDURE — 99284 EMERGENCY DEPT VISIT MOD MDM: CPT

## 2023-12-26 PROCEDURE — 80061 LIPID PANEL: CPT | Performed by: INTERNAL MEDICINE

## 2023-12-26 RX ORDER — ONDANSETRON 2 MG/ML
4 INJECTION INTRAMUSCULAR; INTRAVENOUS ONCE
Status: COMPLETED | OUTPATIENT
Start: 2023-12-26 | End: 2023-12-26

## 2023-12-26 RX ORDER — MECLIZINE HCL 12.5 MG/1
25 TABLET ORAL ONCE
Status: COMPLETED | OUTPATIENT
Start: 2023-12-26 | End: 2023-12-26

## 2023-12-26 RX ADMIN — MECLIZINE 25 MG: 12.5 TABLET ORAL at 22:19

## 2023-12-26 RX ADMIN — SODIUM CHLORIDE 1000 ML: 0.9 INJECTION, SOLUTION INTRAVENOUS at 23:03

## 2023-12-26 RX ADMIN — ONDANSETRON 4 MG: 2 INJECTION INTRAMUSCULAR; INTRAVENOUS at 22:27

## 2023-12-26 NOTE — Clinical Note
Nichole Beckham was seen and treated in our emergency department on 12/26/2023.                Diagnosis:     Nichole  may return to work on return date.    She may return on this date: 12/30/2023         If you have any questions or concerns, please don't hesitate to call.      Logan Quiroz MD    ______________________________           _______________          _______________  Hospital Representative                              Date                                Time

## 2023-12-26 NOTE — LETTER
[facility] 1  [address] [facility] [place] [address]  No information on file.    December 28, 2023     Patient: Nichole Beckham   YOB: 1985   Date of Visit: 12/26/2023       To Whom it May Concern:    Nichole Beckham is under my professional care. She was seen in the hospital from 12/26/2023 to 12/28/23. She may return to work on 1/2/2024 without limitations.    If you have any questions or concerns, please don't hesitate to call.         Sincerely,          Arlin Hutton PA-C

## 2023-12-27 PROBLEM — R42 VERTIGO: Status: ACTIVE | Noted: 2023-12-27

## 2023-12-27 LAB
ATRIAL RATE: 66 BPM
CHOLEST SERPL-MCNC: 161 MG/DL
EST. AVERAGE GLUCOSE BLD GHB EST-MCNC: 128 MG/DL
HBA1C MFR BLD: 6.1 %
HDLC SERPL-MCNC: 66 MG/DL
LDLC SERPL CALC-MCNC: 83 MG/DL (ref 0–100)
NONHDLC SERPL-MCNC: 95 MG/DL
P AXIS: 51 DEGREES
PR INTERVAL: 154 MS
QRS AXIS: 55 DEGREES
QRSD INTERVAL: 104 MS
QT INTERVAL: 426 MS
QTC INTERVAL: 446 MS
T WAVE AXIS: 33 DEGREES
TRIGL SERPL-MCNC: 60 MG/DL
VENTRICULAR RATE: 66 BPM

## 2023-12-27 PROCEDURE — 96375 TX/PRO/DX INJ NEW DRUG ADDON: CPT

## 2023-12-27 PROCEDURE — 96361 HYDRATE IV INFUSION ADD-ON: CPT

## 2023-12-27 PROCEDURE — 99222 1ST HOSP IP/OBS MODERATE 55: CPT | Performed by: INTERNAL MEDICINE

## 2023-12-27 RX ORDER — MECLIZINE HCL 12.5 MG/1
12.5 TABLET ORAL 3 TIMES DAILY PRN
Qty: 30 TABLET | Refills: 0 | Status: SHIPPED | OUTPATIENT
Start: 2023-12-27

## 2023-12-27 RX ORDER — ACETAMINOPHEN 325 MG/1
650 TABLET ORAL EVERY 6 HOURS PRN
Status: DISCONTINUED | OUTPATIENT
Start: 2023-12-27 | End: 2023-12-28 | Stop reason: HOSPADM

## 2023-12-27 RX ORDER — METHYLPREDNISOLONE SODIUM SUCCINATE 125 MG/2ML
60 INJECTION, POWDER, LYOPHILIZED, FOR SOLUTION INTRAMUSCULAR; INTRAVENOUS ONCE
Status: COMPLETED | OUTPATIENT
Start: 2023-12-27 | End: 2023-12-27

## 2023-12-27 RX ORDER — NAPROXEN 250 MG/1
375 TABLET ORAL 2 TIMES DAILY WITH MEALS
Status: DISCONTINUED | OUTPATIENT
Start: 2023-12-27 | End: 2023-12-28 | Stop reason: HOSPADM

## 2023-12-27 RX ORDER — ENOXAPARIN SODIUM 100 MG/ML
60 INJECTION SUBCUTANEOUS EVERY 12 HOURS SCHEDULED
Status: DISCONTINUED | OUTPATIENT
Start: 2023-12-27 | End: 2023-12-28 | Stop reason: HOSPADM

## 2023-12-27 RX ORDER — FLUTICASONE PROPIONATE 50 MCG
1 SPRAY, SUSPENSION (ML) NASAL DAILY
Status: DISCONTINUED | OUTPATIENT
Start: 2023-12-27 | End: 2023-12-28 | Stop reason: HOSPADM

## 2023-12-27 RX ORDER — DIAZEPAM 5 MG/ML
5 INJECTION, SOLUTION INTRAMUSCULAR; INTRAVENOUS ONCE
Status: COMPLETED | OUTPATIENT
Start: 2023-12-27 | End: 2023-12-27

## 2023-12-27 RX ORDER — ACETAMINOPHEN 325 MG/1
650 TABLET ORAL EVERY 6 HOURS PRN
Status: DISCONTINUED | OUTPATIENT
Start: 2023-12-27 | End: 2023-12-27

## 2023-12-27 RX ORDER — DIAZEPAM 5 MG/1
5 TABLET ORAL EVERY 8 HOURS PRN
Status: DISCONTINUED | OUTPATIENT
Start: 2023-12-27 | End: 2023-12-28 | Stop reason: HOSPADM

## 2023-12-27 RX ORDER — ONDANSETRON 2 MG/ML
4 INJECTION INTRAMUSCULAR; INTRAVENOUS EVERY 6 HOURS PRN
Status: DISCONTINUED | OUTPATIENT
Start: 2023-12-27 | End: 2023-12-28 | Stop reason: HOSPADM

## 2023-12-27 RX ORDER — SODIUM CHLORIDE 9 MG/ML
125 INJECTION, SOLUTION INTRAVENOUS CONTINUOUS
Status: DISCONTINUED | OUTPATIENT
Start: 2023-12-27 | End: 2023-12-28 | Stop reason: HOSPADM

## 2023-12-27 RX ORDER — ENOXAPARIN SODIUM 100 MG/ML
40 INJECTION SUBCUTANEOUS DAILY
Status: DISCONTINUED | OUTPATIENT
Start: 2023-12-27 | End: 2023-12-27

## 2023-12-27 RX ORDER — MECLIZINE HCL 12.5 MG/1
25 TABLET ORAL EVERY 8 HOURS PRN
Status: DISCONTINUED | OUTPATIENT
Start: 2023-12-27 | End: 2023-12-28 | Stop reason: HOSPADM

## 2023-12-27 RX ADMIN — ENOXAPARIN SODIUM 60 MG: 60 INJECTION SUBCUTANEOUS at 08:53

## 2023-12-27 RX ADMIN — FLUTICASONE PROPIONATE 1 SPRAY: 50 SPRAY, METERED NASAL at 10:35

## 2023-12-27 RX ADMIN — METHYLPREDNISOLONE SODIUM SUCCINATE 60 MG: 125 INJECTION, POWDER, FOR SOLUTION INTRAMUSCULAR; INTRAVENOUS at 11:08

## 2023-12-27 RX ADMIN — ENOXAPARIN SODIUM 60 MG: 60 INJECTION SUBCUTANEOUS at 21:16

## 2023-12-27 RX ADMIN — DIAZEPAM 5 MG: 10 INJECTION, SOLUTION INTRAMUSCULAR; INTRAVENOUS at 00:28

## 2023-12-27 RX ADMIN — ACETAMINOPHEN 650 MG: 325 TABLET, FILM COATED ORAL at 15:27

## 2023-12-27 RX ADMIN — NAPROXEN 375 MG: 250 TABLET ORAL at 08:53

## 2023-12-27 RX ADMIN — SODIUM CHLORIDE 125 ML/HR: 0.9 INJECTION, SOLUTION INTRAVENOUS at 11:08

## 2023-12-27 RX ADMIN — ONDANSETRON 4 MG: 2 INJECTION INTRAMUSCULAR; INTRAVENOUS at 11:08

## 2023-12-27 RX ADMIN — NAPROXEN 375 MG: 250 TABLET ORAL at 17:13

## 2023-12-27 NOTE — ASSESSMENT & PLAN NOTE
Presented with sudden onset vertigo starting on day of presentation.  Describes as room spinning sensation.  Associated nausea.  Suspected etiology likely due to BPPV versus vestibular neuritis versus labyrinthitis.  Does state recent viral infection in last 1 month.  Had intermittent fluctuating hearing loss on right side as well.      Low suspicion for acute CVA given lack of focal deficit  Consult PT as patient had poor ambulation requiring assistance  Lipid panel controlled; A1c 6.1%  Will need outpatient vestibular therapy for suspected BPPV  Meclizine ineffective, trial valium q8 hr as needed  With sinus congestion and thick, discolored mucous today, continued fullness - start cefdinir

## 2023-12-27 NOTE — ED NOTES
Attempted to ambulate patient. Patient stated that once they began to stand they felt dizzy and unable to completely stand to ambulate. Notified Resident of this matter.      Noah Chakraborty  12/27/23 0137

## 2023-12-27 NOTE — ASSESSMENT & PLAN NOTE
Presented with sudden onset vertigo starting on day of presentation.  Describes as room spinning sensation.  Associated nausea.  Suspected etiology likely due to BPPV versus vestibular neuritis versus labyrinthitis.  Does state recent viral infection in last 1 month.  Had intermittent fluctuating hearing loss on right side as well.      Low suspicion for acute CVA given lack of focal deficit  Consult PT as patient had poor ambulation requiring assistance  Check lipid panel, A1c  Will need outpatient vestibular therapy for suspected BPPV  Meclizine ineffective, trial valium q8 hr as needed  Given right-sided fullness - will start flonase and give single dose IV solumedrol

## 2023-12-27 NOTE — PROGRESS NOTES
[facility]  Progress Note  Name: Nichole Beckham I  MRN: [id number]  Unit/Bed#: ED-16 I Date of Admission: 12/26/2023   Date of Service: 12/27/2023 I Hospital Day: 0    Post admission follow up    Assessment/Plan   * Vertigo  Assessment & Plan  Presented with sudden onset vertigo starting on day of presentation.  Describes as room spinning sensation.  Associated nausea.  Suspected etiology likely due to BPPV versus vestibular neuritis versus labyrinthitis.  Does state recent viral infection in last 1 month.  Had intermittent fluctuating hearing loss on right side as well.      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  Continue outpatient follow-up

## 2023-12-27 NOTE — ED PROVIDER NOTES
History  Chief Complaint   Patient presents with    Dizziness     Pt reports partial loss of hearing then dizziness/nausea/vomiting on Sunday, 12/24/23. Pt reports recent congestion.      38 year old Female with PMH of RBBB who presents to the ED for dizziness along with nausea and vomiting that have been ongoing since yesterday. She mentions that when she has movements, her dizziness is worse. The symptoms improve when she stays still and lies down with her eyes closed. She says that the dizziness has been causing her to have nausea and vomiting since 11 am today and has had a hard time keeping anything down. She also mentions having been sick recently. Denies chest pain, SOB, cough, abdominal pain, diarrhea, fever, chills, lightheadedness, HA, dysuria, hematuria, hematochezia, or melena.           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    Splenic mass 8/15/2016       Past Surgical History:   Procedure Laterality Date    ROOT CANAL         Family History   Problem Relation Age of Onset    Hypertension Mother     Hyperlipidemia Mother     Peripheral vascular disease Father     Breast cancer Maternal Grandmother     Transient ischemic attack Maternal Grandmother     Diabetes Maternal Grandfather     Hypertension Maternal Grandfather     Hyperlipidemia Maternal Grandfather     Rheum arthritis Family     Diabetes Family     Stroke Family     Fibromyalgia Family     Heart attack Family         MI     I have reviewed and agree with the history as documented.    E-Cigarette/Vaping    E-Cigarette Use Never User      E-Cigarette/Vaping  Substances    Nicotine No     THC No     CBD No     Flavoring No      Social History     Tobacco Use    Smoking status: Former    Smokeless tobacco: Former    Tobacco comments:     no passive smoke exposure    Vaping Use    Vaping status: Never Used   Substance Use Topics    Alcohol use: Yes     Alcohol/week: 3.0 - 5.0 standard drinks of alcohol     Types: 3 - 5 Cans of beer per week     Comment: socially    Drug use: No        Review of Systems   Constitutional:  Negative for appetite change, chills, diaphoresis, fatigue and fever.   HENT:  Negative for congestion, ear pain, postnasal drip, rhinorrhea, sore throat and trouble swallowing.    Eyes:  Negative for pain and visual disturbance.   Respiratory:  Negative for cough and shortness of breath.    Cardiovascular:  Negative for chest pain and palpitations.   Gastrointestinal:  Positive for nausea and vomiting. Negative for abdominal pain, constipation and diarrhea.   Genitourinary:  Negative for decreased urine volume, dysuria and hematuria.   Musculoskeletal:  Negative for arthralgias and back pain.   Skin:  Negative for color change and rash.   Neurological:  Positive for dizziness. Negative for seizures, syncope, weakness, light-headedness and headaches.   All other systems reviewed and are negative.      Physical Exam  ED Triage Vitals   Temperature Pulse Respirations Blood Pressure SpO2   12/26/23 2104 12/26/23 2104 12/26/23 2104 12/26/23 2104 12/26/23 2104   97.6 °F (36.4 °C) 85 18 152/83 94 %      Temp Source Heart Rate Source Patient Position - Orthostatic VS BP Location FiO2 (%)   12/26/23 2104 12/26/23 2104 12/26/23 2104 12/26/23 2104 --   Oral Monitor Standing Right arm       Pain Score       12/27/23 0853       4             Orthostatic Vital Signs  Vitals:    12/27/23 0752 12/27/23 1111 12/27/23 1125 12/27/23 1527   BP: 158/71 115/56 118/67 140/77   Pulse: 77 68 62 (!) 107   Patient Position - Orthostatic VS: Sitting Sitting Sitting Lying        Physical Exam  Vitals and nursing note reviewed.   Constitutional:       Appearance: Normal appearance. She is normal weight.   HENT:      Head: Normocephalic and atraumatic.      Right Ear: External ear normal.      Left Ear: External ear normal.      Nose: Nose normal.      Mouth/Throat:      Pharynx: Oropharynx is clear.   Eyes:      Extraocular Movements: Extraocular movements intact.      Conjunctiva/sclera: Conjunctivae normal.      Pupils: Pupils are equal, round, and reactive to light.      Comments: Right sided Fatigable nystagmus noted on exam.   Cardiovascular:      Rate and Rhythm: Normal rate and regular rhythm.      Pulses: Normal pulses.      Heart sounds: Normal heart sounds.      Comments: RRR with +S1 and S2, no murmurs appreciated on exam. Peripheral pulses intact.    Pulmonary:      Effort: Pulmonary effort is normal.      Breath sounds: Normal breath sounds. No wheezing.      Comments: CTABL with no abnormal lung sounds such as wheezes or rales appreciated on exam.     Abdominal:      General: Abdomen is flat. Bowel sounds are normal. There is no distension.      Palpations: Abdomen is soft.      Tenderness: There is no abdominal tenderness.      Comments: Soft, non tender, normo-active bowel sounds. Without rigidity, guarding, or distension.     Musculoskeletal:         General: Normal range of motion.      Cervical back: Normal range of motion.   Skin:     General: Skin is warm and dry.   Neurological:      General: No focal deficit present.      Mental Status: She is alert and oriented to person, place, and time. Mental status is at baseline.      Comments: CN II-XII intact. No focal neurologic deficits noted on exam.  5/5 strength in all extremities. Neurovascularly intact with normal sensation and motor function.           ED Medications  Medications   ondansetron (ZOFRAN) injection 4 mg (4 mg Intravenous Given 12/27/23 1108)   meclizine (ANTIVERT) tablet 25 mg (has no administration  in time range)   naproxen (NAPROSYN) tablet 375 mg (375 mg Oral Given 12/27/23 1713)   enoxaparin (LOVENOX) subcutaneous injection 60 mg (60 mg Subcutaneous Given 12/27/23 0853)   diazepam (VALIUM) tablet 5 mg (has no administration in time range)   sodium chloride 0.9 % infusion (125 mL/hr Intravenous New Bag 12/27/23 1108)   fluticasone (FLONASE) 50 mcg/act nasal spray 1 spray (1 spray Each Nare Given 12/27/23 1035)   acetaminophen (TYLENOL) tablet 650 mg (650 mg Oral Given 12/27/23 1527)   meclizine (ANTIVERT) tablet 25 mg (25 mg Oral Given 12/26/23 2219)   ondansetron (ZOFRAN) injection 4 mg (4 mg Intravenous Given 12/26/23 2227)   sodium chloride 0.9 % bolus 1,000 mL (0 mL Intravenous Stopped 12/27/23 0307)   diazepam (VALIUM) injection 5 mg (5 mg Intravenous Given 12/27/23 0028)   methylPREDNISolone sodium succinate (Solu-MEDROL) injection 60 mg (60 mg Intravenous Given 12/27/23 1108)       Diagnostic Studies  Results Reviewed       Procedure Component Value Units Date/Time    Hemoglobin A1C [[id number]]  (Abnormal) Collected: 12/26/23 2210    Lab Status: Final result Specimen: Blood from Hand, Left Updated: 12/27/23 1903     Hemoglobin A1C 6.1 %       mg/dl     Lipid panel [[id number]] Collected: 12/26/23 2210    Lab Status: Final result Specimen: Blood from Hand, Left Updated: 12/27/23 1019     Cholesterol 161 mg/dL      Triglycerides 60 mg/dL      HDL, Direct 66 mg/dL      LDL Calculated 83 mg/dL      Non-HDL-Chol (CHOL-HDL) 95 mg/dl     FLU/RSV/COVID - if FLU/RSV clinically relevant [[id number]]  (Normal) Collected: 12/26/23 2243    Lab Status: Final result Specimen: Nares from Nose Updated: 12/26/23 2335     SARS-CoV-2 Negative     INFLUENZA A PCR Negative     INFLUENZA B PCR Negative     RSV PCR Negative    Narrative:      FOR PEDIATRIC PATIENTS - copy/paste COVID Guidelines URL to browser: [email or web address]    SARS-CoV-2 assay is a Nucleic  Acid Amplification assay intended for the  qualitative detection of nucleic acid from SARS-CoV-2 in nasopharyngeal  swabs. Results are for the presumptive identification of SARS-CoV-2 RNA.    Positive results are indicative of infection with SARS-CoV-2, the virus  causing COVID-19, but do not rule out bacterial infection or co-infection  with other viruses. Laboratories within the [place] and its  territories are required to report all positive results to the appropriate  public health authorities. Negative results do not preclude SARS-CoV-2  infection and should not be used as the sole basis for treatment or other  patient management decisions. Negative results must be combined with  clinical observations, patient history, and epidemiological information.  This test has not been FDA cleared or approved.    This test has been authorized by FDA under an Emergency Use Authorization  (EUA). This test is only authorized for the duration of time the  declaration that circumstances exist justifying the authorization of the  emergency use of an in vitro diagnostic tests for detection of SARS-CoV-2  virus and/or diagnosis of COVID-19 infection under section 564(b)(1) of  the Act, 21 U.S.C. 360bbb-3(b)(1), unless the authorization is terminated  or revoked sooner. The test has been validated but independent review by FDA  and CLIA is pending.    Test performed using [employer] GeneXpert: This RT-PCR assay targets N2,  a region unique to SARS-CoV-2. A conserved region in the E-gene was chosen  for pan-Sarbecovirus detection which includes SARS-CoV-2.    According to CMS-2020-01-R, this platform meets the definition of high-throughput technology.    Comprehensive metabolic panel [[id number]]  (Abnormal) Collected: 12/26/23 2210    Lab Status: Final result Specimen: Blood from Hand, Left Updated: 12/26/23 2239     Sodium 137 mmol/L      Potassium 4.4 mmol/L      Chloride 106 mmol/L      CO2 24 mmol/L      ANION GAP 7 mmol/L       BUN 11 mg/dL      Creatinine 0.60 mg/dL      Glucose 153 mg/dL      Calcium 8.9 mg/dL      AST 20 U/L      ALT 19 U/L      Alkaline Phosphatase 60 U/L      Total Protein 7.1 g/dL      Albumin 4.3 g/dL      Total Bilirubin 0.42 mg/dL      eGFR 117 ml/min/1.73sq m     Narrative:      National Kidney Disease Foundation guidelines for Chronic Kidney Disease (CKD):     Stage 1 with normal or high GFR (GFR > 90 mL/min/1.73 square meters)    Stage 2 Mild CKD (GFR = 60-89 mL/min/1.73 square meters)    Stage 3A Moderate CKD (GFR = 45-59 mL/min/1.73 square meters)    Stage 3B Moderate CKD (GFR = 30-44 mL/min/1.73 square meters)    Stage 4 Severe CKD (GFR = 15-29 mL/min/1.73 square meters)    Stage 5 End Stage CKD (GFR <15 mL/min/1.73 square meters)  Note: GFR calculation is accurate only with a steady state creatinine    CBC and differential [[id number]]  (Abnormal) Collected: 12/26/23 2210    Lab Status: Final result Specimen: Blood from Hand, Left Updated: 12/26/23 2218     WBC 9.33 Thousand/uL      RBC 4.82 Million/uL      Hemoglobin 12.2 g/dL      Hematocrit 38.6 %      MCV 80 fL      MCH 25.3 pg      MCHC 31.6 g/dL      RDW 15.9 %      MPV 11.0 fL      Platelets 204 Thousands/uL      nRBC 0 /100 WBCs      Neutrophils Relative 85 %      Immat GRANS % 0 %      Lymphocytes Relative 12 %      Monocytes Relative 3 %      Eosinophils Relative 0 %      Basophils Relative 0 %      Neutrophils Absolute 7.82 Thousands/µL      Immature Grans Absolute 0.03 Thousand/uL      Lymphocytes Absolute 1.11 Thousands/µL      Monocytes Absolute 0.32 Thousand/µL      Eosinophils Absolute 0.02 Thousand/µL      Basophils Absolute 0.03 Thousands/µL                    No orders to display         Procedures  ECG 12 Lead Documentation Only    Date/Time: 12/26/2023 10:56 PM    Performed by: Logan Quiroz MD  Authorized by: Logan Quiroz MD    ECG reviewed by me, the ED Provider: yes    Patient location:  ED  Previous ECG:     Previous ECG:  Compared  to current    Similarity:  No change  Interpretation:     Interpretation: normal    Rate:     ECG rate assessment: normal    Rhythm:     Rhythm: sinus rhythm    QRS:     QRS axis:  Normal  Conduction:     Conduction: abnormal      Abnormal conduction: incomplete RBBB    ST segments:     ST segments:  Normal  T waves:     T waves: normal          ED Course  ED Course as of 12/27/23 1917   Tue Dec 26, 2023   2343 Noted to be negative for flu, RSV, Covid   Wed Dec 27, 2023   0129 Tried to ambulate patient however, she was unable to walk due to the dizziness.   0209 Patient reassessed after ambulation trial and was noted to be dizzy still. Had discussion with her about staying in the hospital for observation given her minimal improvement in symptoms.   0308 I spoke with Dr. Kd Bridges who accepted patient for observation after the patient mentioned not feeling comfortable with discharge at this time.                                       Medical Decision Making  38-year-old female with PMH of RBBB who presented to the ED for dizziness along with nausea and vomiting that have been ongoing since yesterday.  Patient mentions that when she moves her head, the dizziness is worse.  Patient's labs were obtained and reviewed by the ED provider.  Patient's labs showed no acute concerns at this time.  Patient was given 25 mg meclizine, 4 mg Zofran, 5 mg Valium, and 1 L bolus of normal saline.  See ED course for more details about patient's ED stay.  Through shared decision making between the patient and the provider, the patient was planned for admission for observation.  Patient's case was discussed with Dr. Bridges from the hospital admission team and she was accepted for observation under the service of Dr. Bridges.  Patient was agreeable to this plan and did not feel comfortable going home due to her inability to ambulate on her own.    Amount and/or Complexity of Data Reviewed  Labs: ordered.    Risk  Prescription drug  management.  Decision regarding hospitalization.          Disposition  Final diagnoses:   Dizziness     Time reflects when diagnosis was documented in both MDM as applicable and the Disposition within this note       Time User Action Codes Description Comment    12/27/2023  1:03 AM Logan Quiroz Add [R42] Dizziness     12/27/2023  1:03 AM [place] Add [R42] Vertigo     12/27/2023  1:03 AM Logan Quiroz Remove [R42] Vertigo           ED Disposition       ED Disposition   Admit    Condition   Stable    Date/Time   Wed Dec 27, 2023  3:17 AM    Comment   Case was discussed with ESTEFANI and the patient's admission status was agreed to be Admission Status: observation status to the service of Dr. Bridges .               Follow-up Information       Follow up With Specialties Details Why Contact Info Additional Information    Kelsi Bains MD Family Medicine Call in 3 days  [address] [facility]  [address]  [facility] [id number] [phone]       [facility] Emergency Medicine Go to  As needed [address] [place] [address] [id number] [phone] [facility], [address] [place] [address], [id number]            Current Discharge Medication List        START taking these medications    Details   meclizine (ANTIVERT) 12.5 MG tablet Take 1 tablet (12.5 mg total) by mouth 3 (three) times a day as needed for dizziness  Qty: 30 tablet, Refills: 0    Associated Diagnoses: Dizziness           No discharge procedures on file.    PDMP Review         Value Time User    PDMP Reviewed  Yes 11/20/2022  4:17 PM WILLIAM Stephens             ED Provider  Attending physically available and evaluated Nichole Beckham. I managed the patient along with the ED Attending.    Electronically Signed by           Logan Quiroz MD  12/27/23 7009

## 2023-12-27 NOTE — ED ATTENDING ATTESTATION
12/26/2023  I, Yocasta Owens MD, saw and evaluated the patient. I have discussed the patient with the resident/non-physician practitioner and agree with the resident's/non-physician practitioner's findings, Plan of Care, and MDM as documented in the resident's/non-physician practitioner's note, except where noted. All available labs and Radiology studies were reviewed.  I was present for key portions of any procedure(s) performed by the resident/non-physician practitioner and I was immediately available to provide assistance.       At this point I agree with the current assessment done in the Emergency Department.  I have conducted an independent evaluation of this patient a history and physical is as follows:    37 y/o F presenting with dizziness. Last night started, went away, dizzy since 11 am, vomiting, worse with head movement, better with rest. Lightheaded. No other neuro complaints.     Rrr, ctabl, abd soft nt/nd, nonfocal neuro      Likely BPPV versus vestibular neuritis. symptomatic treatment        ED Course         Critical Care Time  Procedures

## 2023-12-27 NOTE — H&P
ADMISSION NOTE   [facility]       Name: Nichole Beckham   Age & Sex: 38 y.o. female   MRN: [id number]  Unit/Bed#: ED-16   Encounter: [id number]  Primary Care Provider: Kelsi Bains MD      Obesity  Assessment & Plan  Highly recommended diet and exercise modification including weight loss    Splenic mass  Assessment & Plan  Mild splenomegaly with a 7.8 x 5.8 x 6.0 cm mildly complex lesion suggestive of a splenic cyst with hemorrhage, very minimally enlarged dating back to 2016.  Continue outpatient follow-up    * Vertigo  Assessment & Plan  Presented with sudden onset vertigo starting on day of presentation.  Describes as room spinning sensation.  Associated nausea.  Suspected etiology likely due to BPPV versus vestibular neuritis versus labyrinthitis.  Does state recent viral infection in last 1 month.  Had intermittent fluctuating hearing loss on right side as well.  Vertigo worse when looking towards right side.  Improves with lying flat.  Noticed fatigable nystagmus.  Gets severely worse with ambulation and has been having difficulty ambulating as well with unsteady feet.  No other neurological signs and symptoms.  Tried meclizine in ED but did not work.  Was admitted overnight for observation due to poor ambulation and persistent nausea.    Zofran as needed  Consult PT as patient had poor ambulation requiring assistance  Check lipid panel, A1c  Very low suspicion of stroke given no neurological finding  Will need outpatient vestibular therapy for suspected BPPV  Meclizine as needed        VTE Prophylaxis: Enoxaparin (Lovenox)  / sequential compression device and foot pump applied   Code Status: Level 1 full code  POLST: Unknown  Discussion with family: None    Anticipated Length of Stay:  Patient will be admitted on an Observation basis with an anticipated length of stay of less than 2 midnights.   Justification for Hospital Stay: Vertigo, ambulatory difficulty    Total Time for  Visit, including Counseling / Coordination of Care: 45 minutes.  Greater than 50% of this total time spent on direct patient counseling and coordination of care.    Chief Complaint:   Sudden onset vertigo for 1 day    History of Present Illness:    Nichole Beckham is a 38 y.o. female who presents with sudden onset vertigo with room spinning sensation starting 1 day prior to presentation.  States associated nausea.  Poor appetite.  Cannot tolerate any kind of meal and had vomiting after that as well.  No dizziness, palpitations, chest pain.  No weakness of numbness of upper or lower extremity.  Denies any chest pain.  Nausea and vertigo gets worse on right side position.  Improves with sleeping.  Worsens very bad with ambulation.    Review of Systems:    Review of Systems   Constitutional:  Negative for activity change, appetite change, chills, diaphoresis, fatigue, fever and unexpected weight change.   HENT:  Negative for rhinorrhea and sore throat.    Eyes:  Negative for visual disturbance.   Respiratory:  Negative for cough, chest tightness and shortness of breath.    Cardiovascular:  Negative for chest pain, palpitations and leg swelling.   Gastrointestinal:  Positive for nausea and vomiting. Negative for abdominal distention, abdominal pain, blood in stool, constipation and diarrhea.   Genitourinary:  Negative for difficulty urinating.   Musculoskeletal:  Positive for gait problem. Negative for arthralgias.   Neurological:  Negative for headaches.   Psychiatric/Behavioral:  Negative for behavioral problems and sleep disturbance.        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 Authorizing Provider   meclizine (ANTIVERT) 12.5 MG tablet Take 1 tablet (12.5 mg total) by mouth 3 (three) times a day as needed for dizziness 12/27/23  Yes Logan Quiroz MD     I have reviewed home medications with patient personally.    Allergies:   Allergies   Allergen Reactions    Amoxicillin Rash    Augmentin [Amoxicillin-Pot Clavulanate] Rash       Social History:     Marital Status: /Civil Union   Substance Use History:   Social History     Substance and Sexual Activity   Alcohol Use Yes    Alcohol/week: 3.0 - 5.0 standard drinks of alcohol    Types: 3 - 5 Cans of beer per week    Comment: socially     Social History     Tobacco Use   Smoking Status Former   Smokeless Tobacco Former   Tobacco Comments    no passive smoke exposure      Social History     Substance and Sexual Activity   Drug Use No       Family History:    Family History   Problem Relation Age of Onset    Hypertension Mother     Hyperlipidemia Mother     Peripheral vascular disease Father     Breast cancer Maternal Grandmother     Transient ischemic attack Maternal Grandmother     Diabetes Maternal Grandfather     Hypertension Maternal Grandfather     Hyperlipidemia Maternal Grandfather     Rheum arthritis Family     Diabetes Family     Stroke Family     Fibromyalgia Family     Heart attack Family         MI        Physical Exam:     Vitals:   Blood Pressure: 133/77 (12/26/23 2323)  Pulse: 60 (12/26/23 2323)  Temperature: 97.6 °F (36.4 °C) (12/26/23 2104)  Temp Source: Oral (12/26/23 2104)  Respirations: 18 (12/26/23 2323)  SpO2: 93 % (12/26/23 2323)    Physical Exam  Vitals reviewed.   Constitutional:       General: She is not in acute distress.     Appearance: Normal  appearance.   HENT:      Head: Normocephalic and atraumatic.   Eyes:      General: No scleral icterus.        Right eye: No discharge.         Left eye: No discharge.   Cardiovascular:      Rate and Rhythm: Normal rate and regular rhythm.      Pulses: Normal pulses.      Heart sounds: Normal heart sounds.   Pulmonary:      Effort: Pulmonary effort is normal. No respiratory distress.      Breath sounds: Normal breath sounds. No wheezing or rales.   Chest:      Chest wall: No tenderness.   Abdominal:      General: Abdomen is flat. Bowel sounds are normal. There is no distension.      Palpations: Abdomen is soft.      Tenderness: There is no abdominal tenderness.   Musculoskeletal:         General: No deformity. Normal range of motion.      Cervical back: Normal range of motion and neck supple.      Right lower leg: No edema.      Left lower leg: No edema.   Skin:     General: Skin is warm.      Coloration: Skin is not jaundiced or pale.      Findings: No rash.   Neurological:      General: No focal deficit present.      Mental Status: She is alert and oriented to person, place, and time. Mental status is at baseline.      Cranial Nerves: No cranial nerve deficit.      Motor: No weakness.   Psychiatric:         Mood and Affect: Mood normal.         Behavior: Behavior normal.         Additional Data:     Lab Results: I have personally reviewed pertinent reports.      Results from last 7 days   Lab Units 12/26/23  2210   WBC Thousand/uL 9.33   HEMOGLOBIN g/dL 12.2   HEMATOCRIT % 38.6   PLATELETS Thousands/uL 204   NEUTROS PCT % 85*   LYMPHS PCT % 12*   MONOS PCT % 3*   EOS PCT % 0     Results from last 7 days   Lab Units 12/26/23  2210   SODIUM mmol/L 137   POTASSIUM mmol/L 4.4   CHLORIDE mmol/L 106   CO2 mmol/L 24   BUN mg/dL 11   CREATININE mg/dL 0.60   ANION GAP mmol/L 7   CALCIUM mg/dL 8.9   ALBUMIN g/dL 4.3   TOTAL BILIRUBIN mg/dL 0.42   ALK PHOS U/L 60   ALT U/L 19   AST U/L 20   GLUCOSE RANDOM mg/dL 153*                        Imaging: I have personally reviewed pertinent reports.      No orders to display       EKG, Pathology, and Other Studies Reviewed on Admission:   EKG: None    Allscripts / Epic Records Reviewed: Yes     ** Please Note: This note has been constructed using a voice recognition system. **

## 2023-12-27 NOTE — ASSESSMENT & PLAN NOTE
Mild splenomegaly with a 7.8 x 5.8 x 6.0 cm mildly complex lesion suggestive of a splenic cyst with hemorrhage, very minimally enlarged dating back to 2016.  Continue outpatient follow-up

## 2023-12-28 VITALS
TEMPERATURE: 98.7 F | HEART RATE: 60 BPM | OXYGEN SATURATION: 94 % | SYSTOLIC BLOOD PRESSURE: 123 MMHG | RESPIRATION RATE: 18 BRPM | DIASTOLIC BLOOD PRESSURE: 79 MMHG

## 2023-12-28 PROCEDURE — RECHECK: Performed by: PHYSICIAN ASSISTANT

## 2023-12-28 PROCEDURE — 97116 GAIT TRAINING THERAPY: CPT

## 2023-12-28 PROCEDURE — 97163 PT EVAL HIGH COMPLEX 45 MIN: CPT

## 2023-12-28 PROCEDURE — 99239 HOSP IP/OBS DSCHRG MGMT >30: CPT | Performed by: PHYSICIAN ASSISTANT

## 2023-12-28 RX ORDER — DIAZEPAM 5 MG/1
5 TABLET ORAL EVERY 8 HOURS PRN
Qty: 6 TABLET | Refills: 0 | Status: SHIPPED | OUTPATIENT
Start: 2023-12-28

## 2023-12-28 RX ORDER — ONDANSETRON 4 MG/1
4 TABLET, ORALLY DISINTEGRATING ORAL EVERY 6 HOURS PRN
Qty: 12 TABLET | Refills: 0 | Status: SHIPPED | OUTPATIENT
Start: 2023-12-28

## 2023-12-28 RX ORDER — CEFDINIR 300 MG/1
300 CAPSULE ORAL EVERY 12 HOURS SCHEDULED
Qty: 13 CAPSULE | Refills: 0 | Status: SHIPPED | OUTPATIENT
Start: 2023-12-28 | End: 2024-01-04

## 2023-12-28 RX ORDER — PREDNISONE 20 MG/1
TABLET ORAL DAILY
Qty: 17 TABLET | Refills: 0 | Status: SHIPPED | OUTPATIENT
Start: 2023-12-28 | End: 2024-01-05

## 2023-12-28 RX ORDER — FLUTICASONE PROPIONATE 50 MCG
1 SPRAY, SUSPENSION (ML) NASAL DAILY
Start: 2023-12-29

## 2023-12-28 RX ORDER — CEFDINIR 300 MG/1
300 CAPSULE ORAL EVERY 12 HOURS SCHEDULED
Status: DISCONTINUED | OUTPATIENT
Start: 2023-12-28 | End: 2023-12-28 | Stop reason: HOSPADM

## 2023-12-28 RX ADMIN — FLUTICASONE PROPIONATE 1 SPRAY: 50 SPRAY, METERED NASAL at 08:00

## 2023-12-28 RX ADMIN — CEFDINIR 300 MG: 300 CAPSULE ORAL at 09:11

## 2023-12-28 RX ADMIN — ACETAMINOPHEN 650 MG: 325 TABLET, FILM COATED ORAL at 07:59

## 2023-12-28 RX ADMIN — ENOXAPARIN SODIUM 60 MG: 60 INJECTION SUBCUTANEOUS at 08:00

## 2023-12-28 RX ADMIN — NAPROXEN 375 MG: 250 TABLET ORAL at 07:56

## 2023-12-28 NOTE — UTILIZATION REVIEW
Initial Clinical Review    Admission: Date/Time/Statement: 12/27/23 0318 Observation   Admission Orders (From admission, onward)       Ordered        12/27/23 0318  Place in Observation  Once                          Orders Placed This Encounter   Procedures    Place in Observation     Standing Status:   Standing     Number of Occurrences:   1     Order Specific Question:   Level of Care     Answer:   Med Surg [16]     ED Arrival Information       Expected   -    Arrival   12/26/2023 20:04    Acuity   Urgent              Means of arrival   Walk-In    Escorted by   Family Member    Service   Hospitalist    Admission type   Emergency              Arrival complaint   dizzy, nausea             Chief Complaint   Patient presents with    Dizziness     Pt reports partial loss of hearing then dizziness/nausea/vomiting on Sunday, 12/24/23. Pt reports recent congestion.        Initial Presentation: 38 y.o. female from home to ED 12/26/23 and observation order placed 12/27/23 due to Vertigo.  Presented due to dizziness with nausea and vomiting starting the day prior to arrival, worse with movement.   On exam:  right sided fatigable nystagmus.  No focal neuro deficits.  Ecg incomplete RBBB.   In the ED given Antivert, Zofran x 3, IVF bolus, Valium - failed ambulation trial, unable to walk due to dizziness.  Plan includes: antiemetics as needed.  PT.  Outpatient vestibular therapy for suspected BPPV.  Meclizine as needed.       ED Triage Vitals   Temperature Pulse Respirations Blood Pressure SpO2   12/26/23 2104 12/26/23 2104 12/26/23 2104 12/26/23 2104 12/26/23 2104   97.6 °F (36.4 °C) 85 18 152/83 94 %      Temp Source Heart Rate Source Patient Position - Orthostatic VS BP Location FiO2 (%)   12/26/23 2104 12/26/23 2104 12/26/23 2104 12/26/23 2104 --   Oral Monitor Standing Right arm       Pain Score       12/27/23 0853       4          Wt Readings from Last 1 Encounters:   02/27/23 (!) 145 kg (320 lb 6.4 oz)     Additional  Vital Signs:   12/28/23 0700 98.7 °F (37.1 °C) 60 18 123/79 95 94 % None (Room air) Lying   12/27/23 2058 98.3 °F (36.8 °C) -- -- -- -- -- -- --   12/27/23 1527 98.8 °F (37.1 °C) 107 Abnormal  18 140/77 101 93 % None (Room air) Lying   12/27/23 1125 -- 62 18 118/67 -- 96 % None (Room air) Sitting   12/27/23 1111 -- 68 18 115/56 -- 95 % None (Room air) Sitting   12/27/23 0752 98.1 °F (36.7 °C) 77 16 158/71 -- 94 % None (Room air)      Pertinent Labs/Diagnostic Test Results:   No orders to display     12/26/23 ecg Normal sinus rhythm   Normal ECG   When compared with ECG of 04-OCT-2019 13:01,   Incomplete right bundle branch block is no longer Present     Results from last 7 days   Lab Units 12/26/23 2243   SARS-COV-2  Negative     Results from last 7 days   Lab Units 12/26/23 2210   WBC Thousand/uL 9.33   HEMOGLOBIN g/dL 12.2   HEMATOCRIT % 38.6   PLATELETS Thousands/uL 204   NEUTROS ABS Thousands/µL 7.82*     Results from last 7 days   Lab Units 12/26/23 2210   SODIUM mmol/L 137   POTASSIUM mmol/L 4.4   CHLORIDE mmol/L 106   CO2 mmol/L 24   ANION GAP mmol/L 7   BUN mg/dL 11   CREATININE mg/dL 0.60   EGFR ml/min/1.73sq m 117   CALCIUM mg/dL 8.9     Results from last 7 days   Lab Units 12/26/23  2210   AST U/L 20   ALT U/L 19   ALK PHOS U/L 60   TOTAL PROTEIN g/dL 7.1   ALBUMIN g/dL 4.3   TOTAL BILIRUBIN mg/dL 0.42     Results from last 7 days   Lab Units 12/26/23 2210   GLUCOSE RANDOM mg/dL 153*     Results from last 7 days   Lab Units 12/26/23 2210   HEMOGLOBIN A1C % 6.1*   EAG mg/dl 128     Results from last 7 days   Lab Units 12/26/23 2243   INFLUENZA A PCR  Negative   INFLUENZA B PCR  Negative   RSV PCR  Negative         ED Treatment:   Medication Administration from 12/26/2023 2004 to 12/27/2023 1422         Date/Time Order Dose Route Action Comments     12/26/2023 2219 EST meclizine (ANTIVERT) tablet 25 mg 25 mg Oral Given --     12/26/2023 2227 EST ondansetron (ZOFRAN) injection 4 mg 4 mg Intravenous  Given --     12/26/2023 2303 EST sodium chloride 0.9 % bolus 1,000 mL 1,000 mL Intravenous New Bag --     12/27/2023 0028 EST diazepam (VALIUM) injection 5 mg 5 mg Intravenous Given --     12/27/2023 1108 EST ondansetron (ZOFRAN) injection 4 mg 4 mg Intravenous Given --     12/27/2023 0853 EST ondansetron (ZOFRAN) injection 4 mg 4 mg Intravenous Not Given --     12/27/2023 0853 EST naproxen (NAPROSYN) tablet 375 mg 375 mg Oral Given --     12/27/2023 0853 EST enoxaparin (LOVENOX) subcutaneous injection 60 mg 60 mg Subcutaneous Given --     12/27/2023 1108 EST sodium chloride 0.9 % infusion 125 mL/hr Intravenous New Bag --     12/27/2023 1035 EST fluticasone (FLONASE) 50 mcg/act nasal spray 1 spray 1 spray Each Nare Given --     12/27/2023 1108 EST methylPREDNISolone sodium succinate (Solu-MEDROL) injection 60 mg 60 mg Intravenous Given loss of iv access          Past Medical History:   Diagnosis Date    Depression     Dysmenorrhea, unspecified 8/15/2016    Elevated transaminase level 8/15/2016    Generalized abdominal pain 8/15/2016    Joint pain 8/15/2016    Ketonuria 8/15/2016    Malaise and fatigue 8/15/2016    Night sweats 8/15/2016    Non-smoker 08/15/2016    RBBB     Right bundle branch block     SOB (shortness of breath) 8/15/2016    Spleen anomaly     mass on spleen    Splenic mass 8/15/2016     Present on Admission:   Vertigo   Obesity   Splenic mass      Admitting Diagnosis: Dizziness [R42]  Age/Sex: 38 y.o. female  Admission Orders:  Scheduled Medications:  enoxaparin, 60 mg, Subcutaneous, Q12H JUAN DAVID  fluticasone, 1 spray, Each Nare, Daily  naproxen, 375 mg, Oral, BID With Meals    cefdinir (OMNICEF) capsule 300 mg  Dose: 300 mg  Freq: Every 12 hours scheduled Route: PO  Indications of Use: ACUTE MAXILLARY SINUSITIS  Start: 12/28/23 0900     Continuous IV Infusions:  sodium chloride, 125 mL/hr, Intravenous, Continuous      PRN Meds:  acetaminophen, 650 mg, Oral, Q6H PRN x 1 12/27, x 1 12/28  diazepam, 5 mg,  Oral, Q8H PRN  meclizine, 25 mg, Oral, Q8H PRN  ondansetron, 4 mg, Intravenous, Q6H PRN x 1 12/27        None    Network Utilization Review Department  ATTENTION: Please call with any questions or concerns to [phone] and carefully listen to the prompts so that you are directed to the right person. All voicemails are confidential.   For Discharge needs, contact Care Management DC Support Team at [phone] opt. [phone]  Send all requests for admission clinical reviews, approved or denied determinations and any other requests to dedicated fax number below belonging to the campus where the patient is receiving treatment. List of dedicated fax numbers for the Facilities:  FACILITY NAME UR FAX NUMBER   ADMISSION DENIALS (Administrative/Medical Necessity) [phone]   DISCHARGE SUPPORT TEAM (NETWORK) [phone]   PARENT CHILD HEALTH (Maternity/NICU/Pediatrics) [phone]   [facility] [phone]   [facility] [phone]   [facility] [phone]   [facility] [phone]   [facility] [phone]   [facility] [phone]   [facility] [phone]   [facility] [phone]   [facility] [phone]   [facility] [phone]   [facility] [phone]

## 2023-12-28 NOTE — DISCHARGE SUMMARY
[facility]  Discharge- Nichole Beckham 1985, 38 y.o. female MRN: [id number]  Unit/Bed#: -Kin Encounter: [id number]  Primary Care Provider: Kelsi Bains MD   Date and time admitted to hospital: 12/26/2023  9:05 PM    * Vertigo  Assessment & Plan  Presented with sudden onset vertigo starting on day of presentation.  Describes as room spinning sensation.  Associated nausea.  Suspected etiology likely due to BPPV versus vestibular neuritis versus labyrinthitis.  Does state recent viral infection in last 1 month.  Had intermittent fluctuating hearing loss on right side as well.      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  Continue outpatient follow-up      Medical Problems       Resolved Problems  Date Reviewed: 12/28/2023   None       Discharging Physician / Practitioner: Arlin Hutton PA-C  PCP: Kelsi Bains MD  Admission Date:   Admission Orders (From admission, onward)       Ordered        12/27/23 0318  Place in Observation  Once                          Discharge Date: 12/28/23    Consultations During Hospital Stay:  PT     Procedures Performed:   None    Significant Findings / Test Results:   None     Incidental Findings:   A1c 6.1%      Test Results Pending at Discharge (will require follow up):   None      Outpatient Tests Requested:  Follow up A1c with PCP to  monitor      Complications:  none     Reason for Admission: vertigo     Hospital Course:   Nichole Beckham is a 38 y.o. female patient who originally presented to the hospital on 12/26/2023 due to vertigo of sudden onset. Reports about a week of URI symptoms with recent cold prior to that followed by a period of relief. No fevers or chills. Did have fullness along with hearing loss in the right ear for a time as well. The vertigo is definitely worse to the right, but improving after initiation of steroids and valium. Meclizine proved ineffective. Given recent URIs, with rebound symptoms and the vertigo, high suspicion for labyrinthitis as culprit for symptoms. Will treat with cefdinir for sinusitis, prednisone taper for inflammation, flonase for congestion, and outpatient referral to PT for vestibular therapy.    Counseled on valium use as needed for severe, unrelenting symptoms along with risks/benefits of medication. Advised on no driving and no alcohol with this medication.     She does have a lot work stress, working most days 14+ hours. Advised on repercussions of this lifestyle on her mental and physical well-being.     Please see above list of diagnoses and related plan for additional information.     Condition at Discharge: fair    Discharge Day Visit / Exam:   * Please refer to separate progress note for these details *    Discussion with Family: Patient declined call to .     Discharge instructions/Information to patient and family:   See after visit summary for information provided to patient and family.      Provisions for Follow-Up Care:  See after visit summary for information related to follow-up care and any pertinent home health orders.      Mobility at time of Discharge:   Basic Mobility Inpatient Raw Score: 20  JH-HLM Goal: 6: Walk 10 steps or more  JH-HLM Achieved: 6: Walk 10 steps or more  HLM Goal achieved. Continue to encourage appropriate mobility.     Disposition:   Home    Planned  Readmission: no     Discharge Statement:  I spent 45 minutes discharging the patient. This time was spent on the day of discharge. I had direct contact with the patient on the day of discharge. Greater than 50% of the total time was spent examining patient, answering all patient questions, arranging and discussing plan of care with patient as well as directly providing post-discharge instructions.  Additional time then spent on discharge activities.    Discharge Medications:  See after visit summary for reconciled discharge medications provided to patient and/or family.      **Please Note: This note may have been constructed using a voice recognition system**

## 2023-12-28 NOTE — PLAN OF CARE
Problem: Potential for Falls  Goal: Patient will remain free of falls  Description: INTERVENTIONS:  - Educate patient/family on patient safety including physical limitations  - Instruct patient to call for assistance with activity   - Consult OT/PT to assist with strengthening/mobility   - Keep Call bell within reach  - Keep bed low and locked with side rails adjusted as appropriate  - Keep care items and personal belongings within reach  - Initiate and maintain comfort rounds  - Make Fall Risk Sign visible to staff  - Apply yellow socks and bracelet for high fall risk patients  - Consider moving patient to room near nurses station  Outcome: Progressing     Problem: PAIN - ADULT  Goal: Verbalizes/displays adequate comfort level or baseline comfort level  Description: Interventions:  - Encourage patient to monitor pain and request assistance  - Assess pain using appropriate pain scale  - Administer analgesics based on type and severity of pain and evaluate response  - Implement non-pharmacological measures as appropriate and evaluate response  - Consider cultural and social influences on pain and pain management  - Notify physician/advanced practitioner if interventions unsuccessful or patient reports new pain  Outcome: Progressing     Problem: INFECTION - ADULT  Goal: Absence or prevention of progression during hospitalization  Description: INTERVENTIONS:  - Assess and monitor for signs and symptoms of infection  - Monitor lab/diagnostic results  - Monitor all insertion sites, i.e. indwelling lines, tubes, and drains  - Monitor endotracheal if appropriate and nasal secretions for changes in amount and color  - [place] appropriate cooling/warming therapies per order  - Administer medications as ordered  - Instruct and encourage patient and family to use good hand hygiene technique  - Identify and instruct in appropriate isolation precautions for identified infection/condition  Outcome: Progressing  Goal: Absence  of fever/infection during neutropenic period  Description: INTERVENTIONS:  - Monitor WBC    Outcome: Progressing     Problem: SAFETY ADULT  Goal: Patient will remain free of falls  Description: INTERVENTIONS:  - Educate patient/family on patient safety including physical limitations  - Instruct patient to call for assistance with activity   - Consult OT/PT to assist with strengthening/mobility   - Keep Call bell within reach  - Keep bed low and locked with side rails adjusted as appropriate  - Keep care items and personal belongings within reach  - Initiate and maintain comfort rounds  - Make Fall Risk Sign visible to staff  - Apply yellow socks and bracelet for high fall risk patients  - Consider moving patient to room near nurses station  Outcome: Progressing  Goal: Maintain or return to baseline ADL function  Description: INTERVENTIONS:  -  Assess patient's ability to carry out ADLs; assess patient's baseline for ADL function and identify physical deficits which impact ability to perform ADLs (bathing, care of mouth/teeth, toileting, grooming, dressing, etc.)  - Assess/evaluate cause of self-care deficits   - Assess range of motion  - Assess patient's mobility; develop plan if impaired  - Assess patient's need for assistive devices and provide as appropriate  - Encourage maximum independence but intervene and supervise when necessary  - Involve family in performance of ADLs  - Assess for home care needs following discharge   - Consider OT consult to assist with ADL evaluation and planning for discharge  - Provide patient education as appropriate  Outcome: Progressing  Goal: Maintains/Returns to pre admission functional level  Description: INTERVENTIONS:  - Perform AM-PAC 6 Click Basic Mobility/ Daily Activity assessment daily.  - Set and communicate daily mobility goal to care team and patient/family/caregiver.   - Collaborate with rehabilitation services on mobility goals if consulted  - Out of bed for  toileting  - Record patient progress and toleration of activity level   Outcome: Progressing

## 2023-12-28 NOTE — PLAN OF CARE
Problem: PHYSICAL THERAPY ADULT  Goal: Performs mobility at highest level of function for planned discharge setting.  See evaluation for individualized goals.  Description: Treatment/Interventions: Functional transfer training, LE strengthening/ROM, Elevations, Therapeutic exercise, Endurance training, Gait training, Bed mobility, Equipment eval/education, Patient/family training          See flowsheet documentation for full assessment, interventions and recommendations.  Note:    Problem List: Decreased endurance, Impaired balance, Decreased safety awareness, Obesity, Decreased mobility, Impaired sensation  Assessment: Pt presents with sudden onset vertigo with room spinning sensation starting 1 day prior to presentation. States associated nausea and poor appetite. Dx: vertigo and splenic mass. order placed for PT eval and tx, w/ activity order of up and out of bed as tolerated and ambulate patient. pt presents w/ comorbidity of obesity and personal factors of living in 2 story house, stair(s) to enter home, and depression. pt presents w/ decreased endurance, impaired balance, gait deviations, altered sensation, decreased safety awareness, and fall risk. these impairments are evident in findings from physical examination (altered sensation and impaired vestibulo-ocular function), mobility assessment (need for standby assist w/ all phases of mobility when usually mobilizing independently, tolerance to only 80 feet of ambulation, and need for cueing for mobility technique), and Barthel Index: 75/100. pt needed input for task focus and mobility technique. pt is at risk for falls due to physical and safety awareness deficits. pt's clinical presentation is unstable/unpredictable (evident in tachycardia, need for standby assist w/ all phases of mobility when usually mobilizing independently, tolerance to only 80 feet of ambulation, vertigo impacting overall mobility status, and need for input for mobility technique).  pt needs inpatient PT tx to improve mobility deficits and progress mobility training as appropriate.        Rehab Resource Intensity Level, PT: III (Minimum Resource Intensity)    See flowsheet documentation for full assessment.

## 2023-12-28 NOTE — PHYSICAL THERAPY NOTE
PHYSICAL THERAPY EVALUATION NOTE    Patient Name: Nichole Beckham  Today's Date: 12/28/2023  AGE:   38 y.o.  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 ambulates w/o assistive device. no DME. independent w/ ADLs and IADLs. no falls in last 6 months.   General   Additional Pertinent History 12/27/23 at 15:27 heart rate was 107 BPM.   Family/Caregiver Present No   Cognition   Arousal/Participation Alert   Orientation Level Oriented to person;Other (Comment)  (pt was identified w/ full name, birth date)   Following Commands Follows all commands and directions without difficulty   Subjective   Subjective pt seen supine in bed. agreed to PT eval. pt states feeling unsteady all the time. also experiencing intermittent vertigo and nausea.   RUE Assessment   RUE Assessment WFL   LUE Assessment   LUE Assessment WFL   RLE Assessment   RLE Assessment WFL  (4+ to 5/5)   LLE Assessment   LLE Assessment WFL  (4+ to 5/5)   Vision-Basic Assessment   Current Vision Wears glasses for distance only    Vestibular   Vestibular Comments forward and lateral gaze normal. smooth pursuits: normal. horiztonal VOR: normal. cervical active ROM: WFL, worsening of symptoms w/ extension, flexion, and right rotation   Coordination   Sensation X  (light touch impaired left anterior calf and dorsum of foot)   Finger to Nose & Finger to Finger  Intact   Heel to Shin Intact   Bed Mobility   Supine to Sit 5  Supervision  (+ vertigo)   Additional items HOB elevated;Increased time required   Transfers   Sit to Stand 5  Supervision  (+ vertigo)   Additional items Increased time required   Stand to Sit 7  Independent   Additional items Increased time required   Ambulation/Elevation   Gait pattern Wide BRENDA;Foward flexed;Short stride   Gait Assistance 5  Supervision   Additional items Verbal cues  (for full step length, use of fixed point in visual field)   Assistive Device None   Distance 80 feet  (additional not possible due to vertigo)   Balance   Static Sitting Good   Dynamic Sitting Fair   Static Standing Fair   Dynamic Standing Fair -   Ambulatory Fair -   Activity Tolerance   Activity Tolerance Other (Comment)  (limited by vertigo/unsteadiness)   Nurse Made Aware spoke to Joan NSG   Assessment   Problem List Decreased endurance;Impaired balance;Decreased safety awareness;Obesity;Decreased mobility;Impaired sensation   Assessment Pt presents with sudden onset vertigo with room spinning sensation starting 1 day prior to presentation. States associated nausea and poor appetite. Dx: vertigo and splenic mass. order placed for PT eval and tx, w/ activity order of up and out of bed as tolerated and ambulate patient. pt presents w/ comorbidity of obesity and personal factors of living in 2 story house, stair(s) to enter home, and depression. pt presents w/ decreased endurance, impaired balance, gait deviations, altered sensation, decreased safety awareness, and fall risk. these impairments are evident in findings from physical  examination (altered sensation and impaired vestibulo-ocular function), mobility assessment (need for standby assist w/ all phases of mobility when usually mobilizing independently, tolerance to only 80 feet of ambulation, and need for cueing for mobility technique), and Barthel Index: 75/100. pt needed input for task focus and mobility technique. pt is at risk for falls due to physical and safety awareness deficits. pt's clinical presentation is unstable/unpredictable (evident in tachycardia, need for standby assist w/ all phases of mobility when usually mobilizing independently, tolerance to only 80 feet of ambulation, vertigo impacting overall mobility status, and need for input for mobility technique). pt needs inpatient PT tx to improve mobility deficits and progress mobility training as appropriate.   Goals   Patient Goals go home. get back to work.   STG Expiration Date 01/07/23   Short Term Goal #1 pt will: Perform all transfers independently to improve independence, Ambulate 400 ft. with least restrictive assistive device independently w/o LOB to improve functional independence and expedite eventual return to work, Navigate 10 stair(s) independently with unilateral handrail to facilitate return to previous living environment, Increase ambulatory balance 1 grade to decrease risk for falls, Tolerate 3 hr OOB to faciliate upright tolerance, Improve Barthel Index score to 95 or greater to facilitate independence, and Complete Timed Up and Go or Comfortable Gait Speed to further assess mobility and monitor progress   PT Treatment Day 1   Plan   Treatment/Interventions Functional transfer training;LE strengthening/ROM;Elevations;Therapeutic exercise;Endurance training;Gait training;Bed mobility;Equipment eval/education;Patient/family training   PT Frequency 2-3x/wk   Discharge Recommendation   Rehab Resource Intensity Level, PT III (Minimum Resource Intensity)   Additional Comments recommend mobilization w/  single point cane   AM-PAC Basic Mobility Inpatient   Turning in Flat Bed Without Bedrails 4   Lying on Back to Sitting on Edge of Flat Bed Without Bedrails 3   Moving Bed to Chair 3   Standing Up From Chair Using Arms 3   Walk in Room 3   Climb 3-5 Stairs With Railing 3   Basic Mobility Inpatient Raw Score 19   Basic Mobility Standardized Score 42.48   Highest Level Of Mobility   -HL Goal 6: Walk 10 steps or more   JH-HLM Achieved 7: Walk 25 feet or more   Barthel Index   Feeding 10   Bathing 0   Grooming Score 5   Dressing Score 10   Bladder Score 10   Bowels Score 10   Toilet Use Score 10   Transfers (Bed/Chair) Score 10   Mobility (Level Surface) Score 10   Stairs Score 0   Barthel Index Score 75   Additional Treatment Session   Start Time 0933   End Time 0943   Treatment Assessment Pt agreed to participate in PT intervention. Therapist introduced single point cane use w/ ambulation to improve gait stability. Therapist reviewed scanning environment w/ eyes (instead of turning head) and use of fixed point in visual field). also reviewed turning slowly, activity pacing, and taking rest breaks when symptoms exacerbate. Pt had good understanding of all presented material. Sit <---> stand transfer w/ supervision. Ambulated 120 feet w/ cane modifiied independent. Sit to supine transition independently. Pt was noted to have improvement w/ use of cane w/ increased ambulation distance and decreased level of assist to maintain safety. continued inpatient PT tx is indicated to reduce fall risk.   Equipment Use single point cane   Additional Treatment Day 1   End of Consult   Patient Position at End of Consult Supine;All needs within reach     The patient's AM-PAC Basic Mobility Inpatient Short Form Raw Score is 19. A Raw score of greater than 16 suggests the patient may benefit from discharge to home. Please also refer to the recommendation of the Physical Therapist for safe discharge planning.    Skilled PT recommended  while in hospital and upon DC to progress pt toward treatment goals.     Master Lawrence, PT

## 2023-12-28 NOTE — PLAN OF CARE
Problem: Potential for Falls  Goal: Patient will remain free of falls  Description: INTERVENTIONS:  - Educate patient/family on patient safety including physical limitations  - Instruct patient to call for assistance with activity   - Consult OT/PT to assist with strengthening/mobility   - Keep Call bell within reach  - Keep bed low and locked with side rails adjusted as appropriate  - Keep care items and personal belongings within reach  - Initiate and maintain comfort rounds  - Make Fall Risk Sign visible to staff  - Apply yellow socks and bracelet for high fall risk patients  - Consider moving patient to room near nurses station  12/28/2023 0111 by Leyla Brewster  Outcome: Progressing  12/28/2023 0111 by Leyla Brewster  Outcome: Progressing     Problem: PAIN - ADULT  Goal: Verbalizes/displays adequate comfort level or baseline comfort level  Description: Interventions:  - Encourage patient to monitor pain and request assistance  - Assess pain using appropriate pain scale  - Administer analgesics based on type and severity of pain and evaluate response  - Implement non-pharmacological measures as appropriate and evaluate response  - Consider cultural and social influences on pain and pain management  - Notify physician/advanced practitioner if interventions unsuccessful or patient reports new pain  Outcome: Progressing     Problem: INFECTION - ADULT  Goal: Absence or prevention of progression during hospitalization  Description: INTERVENTIONS:  - Assess and monitor for signs and symptoms of infection  - Monitor lab/diagnostic results  - Monitor all insertion sites, i.e. indwelling lines, tubes, and drains  - Monitor endotracheal if appropriate and nasal secretions for changes in amount and color  - [place] appropriate cooling/warming therapies per order  - Administer medications as ordered  - Instruct and encourage patient and family to use good hand hygiene technique  - Identify and instruct in appropriate  isolation precautions for identified infection/condition  Outcome: Progressing  Goal: Absence of fever/infection during neutropenic period  Description: INTERVENTIONS:  - Monitor WBC    Outcome: Progressing     Problem: SAFETY ADULT  Goal: Patient will remain free of falls  Description: INTERVENTIONS:  - Educate patient/family on patient safety including physical limitations  - Instruct patient to call for assistance with activity   - Consult OT/PT to assist with strengthening/mobility   - Keep Call bell within reach  - Keep bed low and locked with side rails adjusted as appropriate  - Keep care items and personal belongings within reach  - Initiate and maintain comfort rounds  - Apply yellow socks and bracelet for high fall risk patients  - Consider moving patient to room near nurses station  12/28/2023 0111 by Leyla Brewster  Outcome: Progressing  12/28/2023 0111 by Leyla Brewster  Outcome: Progressing  Goal: Maintain or return to baseline ADL function  Description: INTERVENTIONS:  -  Assess patient's ability to carry out ADLs; assess patient's baseline for ADL function and identify physical deficits which impact ability to perform ADLs (bathing, care of mouth/teeth, toileting, grooming, dressing, etc.)  - Assess/evaluate cause of self-care deficits   - Assess range of motion  - Assess patient's mobility; develop plan if impaired  - Assess patient's need for assistive devices and provide as appropriate  - Encourage maximum independence but intervene and supervise when necessary  - Involve family in performance of ADLs  - Assess for home care needs following discharge   - Consider OT consult to assist with ADL evaluation and planning for discharge  - Provide patient education as appropriate  Outcome: Progressing  Goal: Maintains/Returns to pre admission functional level  Description: INTERVENTIONS:  - Perform AM-PAC 6 Click Basic Mobility/ Daily Activity assessment daily.  - Set and communicate daily mobility goal to  care team and patient/family/caregiver.   - Collaborate with rehabilitation services on mobility goals if consulted  - Out of bed for toileting  - Record patient progress and toleration of activity level   Outcome: Progressing     Problem: DISCHARGE PLANNING  Goal: Discharge to home or other facility with appropriate resources  Description: INTERVENTIONS:  - Identify barriers to discharge w/patient and caregiver  - Arrange for needed discharge resources and transportation as appropriate  - Identify discharge learning needs (meds, wound care, etc.)  - Arrange for interpretive services to assist at discharge as needed  - Refer to Case Management Department for coordinating discharge planning if the patient needs post-hospital services based on physician/advanced practitioner order or complex needs related to functional status, cognitive ability, or social support system  Outcome: Progressing     Problem: Knowledge Deficit  Goal: Patient/family/caregiver demonstrates understanding of disease process, treatment plan, medications, and discharge instructions  Description: Complete learning assessment and assess knowledge base.  Interventions:  - Provide teaching at level of understanding  - Provide teaching via preferred learning methods  Outcome: Progressing     Problem: Nutrition/Hydration-ADULT  Goal: Nutrient/Hydration intake appropriate for improving, restoring or maintaining nutritional needs  Description: Monitor and assess patient's nutrition/hydration status for malnutrition. Collaborate with interdisciplinary team and initiate plan and interventions as ordered.  Monitor patient's weight and dietary intake as ordered or per policy. Utilize nutrition screening tool and intervene as necessary. Determine patient's food preferences and provide high-protein, high-caloric foods as appropriate.     INTERVENTIONS:  - Monitor oral intake, urinary output, labs, and treatment plans  - Assess nutrition and hydration status  and recommend course of action  - Evaluate amount of meals eaten  - Assist patient with eating if necessary   - Allow adequate time for meals  - Recommend/ encourage appropriate diets, oral nutritional supplements, and vitamin/mineral supplements  - Order, calculate, and assess calorie counts as needed  - Recommend, monitor, and adjust tube feedings and TPN/PPN based on assessed needs  - Assess need for intravenous fluids  - Provide specific nutrition/hydration education as appropriate  - Include patient/family/caregiver in decisions related to nutrition  Outcome: Progressing

## 2023-12-28 NOTE — PROGRESS NOTES
[facility]  Progress Note  Name: Nichole Beckham I  MRN: [id number]  Unit/Bed#: -01 I Date of Admission: 12/26/2023   Date of Service: 12/28/2023 I Hospital Day: 0    Assessment/Plan   * Vertigo  Assessment & Plan  Presented with sudden onset vertigo starting on day of presentation.  Describes as room spinning sensation.  Associated nausea.  Suspected etiology likely due to BPPV versus vestibular neuritis versus labyrinthitis.  Does state recent viral infection in last 1 month.  Had intermittent fluctuating hearing loss on right side as well.      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  Continue outpatient follow-up         VTE Pharmacologic Prophylaxis: VTE Score: 3  ambulatory    Mobility:   Basic Mobility Inpatient Raw Score: 20  JH-HLM Goal: 6: Walk 10 steps or more  JH-HLM Achieved: 6: Walk 10 steps or more  HLM Goal achieved. Continue to encourage appropriate mobility.    Patient Centered Rounds: I evaluated the patient without nursing staff present due to d/w nurse    Discussions with Specialists or Other Care Team Provider:     Education and Discussions with Family / Patient: Patient declined call to .     Total Time Spent on Date of Encounter in care of patient: 31 mins. This time was spent on one or more of the following: performing physical exam;  counseling and coordination of care; obtaining or reviewing history; documenting in the medical record; reviewing/ordering tests, medications or procedures; communicating with other healthcare professionals and discussing with patient's family/caregivers.    Current Length of Stay: 0 day(s)  Current Patient Status: Observation   Certification Statement: The patient, admitted on an observation basis, will now require > 2 midnight hospital stay due to pending PT evals for safe discharge planning  Discharge Plan: Anticipate discharge later today or tomorrow to home.    Code Status: Level 1 - Full Code    Subjective:   Patient reports some improvement in symptoms, not resolved. No complaint of headache, but continues to have ear fullness and now with thick, discolored mucous and post-nasal drip.     Objective:     Vitals:   Temp (24hrs), Av.6 °F (37 °C), Min:98.3 °F (36.8 °C), Max:98.8 °F (37.1 °C)    Temp:  [98.3 °F (36.8 °C)-98.8 °F (37.1 °C)] 98.7 °F (37.1 °C)  HR:  [] 60  Resp:  [18] 18  BP: (115-140)/(56-79) 123/79  SpO2:  [93 %-96 %] 94 %  There is no height or weight on file to calculate BMI.     Input and Output Summary (last 24 hours):   No intake or output data in the 24 hours ending 23 0834    Physical Exam:   Physical Exam  Vitals and nursing note reviewed.   Constitutional:       General: She is awake. She is not in acute distress.     Appearance: Normal appearance. She is well-developed and well-groomed. She is obese. She is not ill-appearing or toxic-appearing.   HENT:      Nose: Congestion present.      Right Sinus: No maxillary sinus tenderness or frontal sinus tenderness.      Left Sinus: No maxillary sinus tenderness or frontal sinus tenderness.   Cardiovascular:      Rate and Rhythm: Normal rate.   Pulmonary:      Effort: Pulmonary effort is normal. No respiratory distress.   Skin:     Coloration: Skin is not pale.   Neurological:      Mental Status: She is alert and oriented to  person, place, and time.   Psychiatric:         Mood and Affect: Mood normal.         Behavior: Behavior normal. Behavior is cooperative.           Additional Data:     Labs:  Results from last 7 days   Lab Units 12/26/23  2210   WBC Thousand/uL 9.33   HEMOGLOBIN g/dL 12.2   HEMATOCRIT % 38.6   PLATELETS Thousands/uL 204   NEUTROS PCT % 85*   LYMPHS PCT % 12*   MONOS PCT % 3*   EOS PCT % 0     Results from last 7 days   Lab Units 12/26/23  2210   SODIUM mmol/L 137   POTASSIUM mmol/L 4.4   CHLORIDE mmol/L 106   CO2 mmol/L 24   BUN mg/dL 11   CREATININE mg/dL 0.60   ANION GAP mmol/L 7   CALCIUM mg/dL 8.9   ALBUMIN g/dL 4.3   TOTAL BILIRUBIN mg/dL 0.42   ALK PHOS U/L 60   ALT U/L 19   AST U/L 20   GLUCOSE RANDOM mg/dL 153*             Results from last 7 days   Lab Units 12/26/23  2210   HEMOGLOBIN A1C % 6.1*           Lines/Drains:  Invasive Devices       Peripheral Intravenous Line  Duration             Peripheral IV 12/27/23 Left;Ventral (anterior) Forearm <1 day                    Imaging: No pertinent imaging reviewed.    Recent Cultures (last 7 days):         Last 24 Hours Medication List:   Current Facility-Administered Medications   Medication Dose Route Frequency Provider Last Rate    acetaminophen  650 mg Oral Q6H PRN Arlin Hutton PA-C      cefdinir  300 mg Oral Q12H [facility] Arlin Hutton PA-C      diazepam  5 mg Oral Q8H PRN Arlin Hutton PA-C      enoxaparin  60 mg Subcutaneous Q12H [facility] Kd Bridges MD      fluticasone  1 spray Each Nare Daily Arlin Hutton PA-C      meclizine  25 mg Oral Q8H PRN Kd Bridges MD      naproxen  375 mg Oral BID With Meals Kd Bridges MD      ondansetron  4 mg Intravenous Q6H PRN Kd Bridges MD      sodium chloride  125 mL/hr Intravenous Continuous Arlin Hutton PA-C 125 mL/hr (12/27/23 1108)        Today, Patient Was Seen By: Arlin Hutton PA-C    **Please Note: This note may have been constructed using a voice recognition  system.**

## 2023-12-29 NOTE — UTILIZATION REVIEW
NOTIFICATION OF INPATIENT ADMISSION   AUTHORIZATION REQUEST   SERVICING FACILITY:   [place] [address]  Tax ID: [id number]  NPI: [id number]   ATTENDING PROVIDER:  Attending Name and NPI#: Sarah Anne Md [[id number]]  Address: [address] [place] [address]  Phone: [phone]     ADMISSION INFORMATION:  Place of Service: Inpatient [facility]  Place of Service Code: 21  Inpatient Admission Date/Time: N/A N/A  Discharge Date/Time: 12/28/2023 12:39 PM  Admitting Diagnosis Code/Description:  Dizziness [R42]     UTILIZATION REVIEW CONTACT:  Edmar Taylor Utilization   Network Utilization Review Department  Phone: [phone]  Fax: [phone]  Email: [email or web address]  Contact for approvals/pending authorizations, clinical reviews, and discharge.     PHYSICIAN ADVISORY SERVICES:  Medical Necessity Denial & Htwr-[email or web address] Review  Phone: [phone]  Fax: [phone]  Email: [email or web address]     DISCHARGE SUPPORT TEAM:  For Patients Discharge Needs & Updates  Phone: [phone] opt. 2 Fax: [phone]  Email: [email or web address]

## 2025-04-04 ENCOUNTER — TRANSITIONAL CARE MANAGEMENT (OUTPATIENT)
Dept: FAMILY MEDICINE CLINIC | Facility: CLINIC | Age: 40
End: 2025-04-04